# Patient Record
Sex: MALE | Race: OTHER | Employment: UNEMPLOYED | URBAN - NONMETROPOLITAN AREA
[De-identification: names, ages, dates, MRNs, and addresses within clinical notes are randomized per-mention and may not be internally consistent; named-entity substitution may affect disease eponyms.]

---

## 2018-07-22 ENCOUNTER — HOSPITAL ENCOUNTER (EMERGENCY)
Age: 43
Discharge: HOME | End: 2018-07-22
Payer: COMMERCIAL

## 2018-07-22 VITALS
DIASTOLIC BLOOD PRESSURE: 90 MMHG | SYSTOLIC BLOOD PRESSURE: 135 MMHG | HEIGHT: 69 IN | WEIGHT: 200 LBS | RESPIRATION RATE: 16 BRPM | OXYGEN SATURATION: 97 % | TEMPERATURE: 98.7 F | BODY MASS INDEX: 29.62 KG/M2 | HEART RATE: 95 BPM

## 2018-07-22 DIAGNOSIS — S71.151A DOG BITE OF RIGHT THIGH, INITIAL ENCOUNTER: Primary | ICD-10-CM
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
## 2018-07-22 PROCEDURE — 99213 OFFICE O/P EST LOW 20 MIN: CPT | Performed by: NURSE PRACTITIONER

## 2018-07-22 PROCEDURE — 6360000002 HC RX W HCPCS: Performed by: NURSE PRACTITIONER

## 2018-07-22 PROCEDURE — 90471 IMMUNIZATION ADMIN: CPT | Performed by: NURSE PRACTITIONER

## 2018-07-22 PROCEDURE — 99213 OFFICE O/P EST LOW 20 MIN: CPT

## 2018-07-22 PROCEDURE — 90715 TDAP VACCINE 7 YRS/> IM: CPT | Performed by: NURSE PRACTITIONER

## 2018-07-22 RX ORDER — AMOXICILLIN AND CLAVULANATE POTASSIUM 875; 125 MG/1; MG/1
1 TABLET, FILM COATED ORAL 2 TIMES DAILY
Qty: 20 TABLET | Refills: 0 | Status: SHIPPED | OUTPATIENT
Start: 2018-07-22 | End: 2018-08-01

## 2018-07-22 RX ORDER — CLINDAMYCIN HYDROCHLORIDE 300 MG/1
300 CAPSULE ORAL 3 TIMES DAILY
Qty: 30 CAPSULE | Refills: 0 | Status: SHIPPED | OUTPATIENT
Start: 2018-07-22 | End: 2018-08-01

## 2018-07-22 RX ADMIN — TETANUS TOXOID, REDUCED DIPHTHERIA TOXOID AND ACELLULAR PERTUSSIS VACCINE, ADSORBED 0.5 ML: 5; 2.5; 8; 8; 2.5 SUSPENSION INTRAMUSCULAR at 16:33

## 2018-07-22 ASSESSMENT — PAIN SCALES - GENERAL: PAINLEVEL_OUTOF10: 3

## 2018-07-22 ASSESSMENT — ENCOUNTER SYMPTOMS
APNEA: 0
ABDOMINAL DISTENTION: 0
EYE PAIN: 0
SHORTNESS OF BREATH: 0
EYE DISCHARGE: 0
SORE THROAT: 0
BACK PAIN: 0
CHEST TIGHTNESS: 0
DIARRHEA: 0
NAUSEA: 0
CONSTIPATION: 0
PHOTOPHOBIA: 0
FACIAL SWELLING: 0

## 2018-07-22 ASSESSMENT — PAIN DESCRIPTION - ORIENTATION: ORIENTATION: RIGHT;OUTER;UPPER

## 2018-07-22 ASSESSMENT — PAIN DESCRIPTION - LOCATION: LOCATION: LEG

## 2018-07-22 NOTE — ED NOTES
Discharge assessment complete. No changes. All discharge education and information given. Instructed to go to ED for any worsening symptoms. Verbalized Understanding. Left in stable cond.      Shanon Tejeda RN  07/22/18 7861

## 2018-07-22 NOTE — ED NOTES
Presents with c/o dog bite to right upper, outer thigh at approx 1215 today. St bit by stray casper bull dog.       Shanon Tejeda RN  07/22/18 [id number]

## 2018-07-22 NOTE — ED PROVIDER NOTES
MEDICATIONS       Previous Medications    No medications on file       ALLERGIES     Patient is is allergic to dilaudid [hydromorphone]. FAMILY HISTORY     Patient's family history is not on file. SOCIAL HISTORY     Patient  reports that he has never smoked. He has never used smokeless tobacco. He reports that he does not use drugs. PHYSICAL EXAM     ED TRIAGE VITALS  BP: (!) 135/90, Temp: 98.7 °F (37.1 °C), Pulse: 95, Resp: 16, SpO2: 97 %  Physical Exam   Constitutional: He is oriented to person, place, and time. He appears well-developed and well-nourished. No distress. HENT:   Head: Normocephalic. Nose: Nose normal.   Mouth/Throat: Oropharynx is clear and moist.   Eyes: Right eye exhibits no discharge. Left eye exhibits no discharge. No scleral icterus. Neck: Normal range of motion. Cardiovascular: Normal rate, regular rhythm, normal heart sounds and intact distal pulses. Pulmonary/Chest: Effort normal and breath sounds normal. He has no wheezes. He has no rales. Abdominal: Soft. Bowel sounds are normal. He exhibits no distension. There is no tenderness. Musculoskeletal: Normal range of motion. He exhibits tenderness. He exhibits no edema. Right upper leg: He exhibits tenderness and swelling. He exhibits no edema and no deformity. Legs:  2 small bite marks which have not penetrated the skin. 1- 5mm linear cut into the skin, shallow, no bleeding, no surrounding bruising or erythema. Lymphadenopathy:     He has no cervical adenopathy. Neurological: He is alert and oriented to person, place, and time. Skin: Skin is warm and dry. No rash noted. There is erythema. Psychiatric: He has a normal mood and affect. His behavior is normal. Judgment and thought content normal.   Nursing note and vitals reviewed. DIAGNOSTIC RESULTS   Labs:No results found for this visit on 07/22/18.     IMAGING:  No orders to display     URGENT CARE COURSE:     Vitals:    07/22/18 1605   BP:

## 2019-03-10 ENCOUNTER — HOSPITAL ENCOUNTER (EMERGENCY)
Age: 44
Discharge: HOME | End: 2019-03-10
Payer: COMMERCIAL

## 2019-03-10 VITALS
HEIGHT: 67 IN | DIASTOLIC BLOOD PRESSURE: 99 MMHG | HEART RATE: 100 BPM | TEMPERATURE: 99.2 F | SYSTOLIC BLOOD PRESSURE: 146 MMHG | BODY MASS INDEX: 30.61 KG/M2 | RESPIRATION RATE: 16 BRPM | OXYGEN SATURATION: 97 % | WEIGHT: 195 LBS

## 2019-03-10 DIAGNOSIS — R03.0 ELEVATED BLOOD PRESSURE READING: ICD-10-CM

## 2019-03-10 DIAGNOSIS — J02.0 STREPTOCOCCAL SORE THROAT: Primary | ICD-10-CM

## 2019-03-10 LAB
FLU A ANTIGEN: NEGATIVE
FLU B ANTIGEN: NEGATIVE
GROUP A STREP CULTURE, REFLEX: POSITIVE
REFLEX THROAT C + S: NORMAL

## 2019-03-10 PROCEDURE — 87804 INFLUENZA ASSAY W/OPTIC: CPT

## 2019-03-10 PROCEDURE — 2500000003 HC RX 250 WO HCPCS: Performed by: NURSE PRACTITIONER

## 2019-03-10 PROCEDURE — 99214 OFFICE O/P EST MOD 30 MIN: CPT | Performed by: NURSE PRACTITIONER

## 2019-03-10 PROCEDURE — 87880 STREP A ASSAY W/OPTIC: CPT

## 2019-03-10 PROCEDURE — 6360000002 HC RX W HCPCS: Performed by: NURSE PRACTITIONER

## 2019-03-10 PROCEDURE — 99213 OFFICE O/P EST LOW 20 MIN: CPT

## 2019-03-10 RX ORDER — AMOXICILLIN 500 MG/1
500 CAPSULE ORAL 2 TIMES DAILY
Qty: 20 CAPSULE | Refills: 0 | Status: SHIPPED | OUTPATIENT
Start: 2019-03-10 | End: 2019-03-20

## 2019-03-10 RX ADMIN — LIDOCAINE HYDROCHLORIDE 1 G: 10 INJECTION, SOLUTION EPIDURAL; INFILTRATION; INTRACAUDAL; PERINEURAL at 13:09

## 2019-03-10 ASSESSMENT — ENCOUNTER SYMPTOMS
DIARRHEA: 0
SINUS PRESSURE: 0
NAUSEA: 0
VOMITING: 0
SHORTNESS OF BREATH: 0
COUGH: 0
SORE THROAT: 1
CHEST TIGHTNESS: 0
ABDOMINAL PAIN: 0
RHINORRHEA: 0

## 2019-03-10 ASSESSMENT — PAIN SCALES - GENERAL: PAINLEVEL_OUTOF10: 10

## 2019-03-10 ASSESSMENT — PAIN DESCRIPTION - LOCATION: LOCATION: THROAT

## 2019-07-23 ENCOUNTER — HOSPITAL ENCOUNTER (EMERGENCY)
Age: 44
Discharge: HOME | End: 2019-07-24
Payer: COMMERCIAL

## 2019-07-23 VITALS
HEIGHT: 69 IN | RESPIRATION RATE: 16 BRPM | DIASTOLIC BLOOD PRESSURE: 91 MMHG | BODY MASS INDEX: 29.62 KG/M2 | SYSTOLIC BLOOD PRESSURE: 145 MMHG | HEART RATE: 80 BPM | TEMPERATURE: 98 F | WEIGHT: 200 LBS | OXYGEN SATURATION: 98 %

## 2019-07-23 DIAGNOSIS — M54.50 ACUTE EXACERBATION OF CHRONIC LOW BACK PAIN: Primary | ICD-10-CM

## 2019-07-23 DIAGNOSIS — G89.29 ACUTE EXACERBATION OF CHRONIC LOW BACK PAIN: Primary | ICD-10-CM

## 2019-07-23 PROCEDURE — 99283 EMERGENCY DEPT VISIT LOW MDM: CPT

## 2019-07-23 RX ORDER — ORPHENADRINE CITRATE 30 MG/ML
60 INJECTION INTRAMUSCULAR; INTRAVENOUS ONCE
Status: COMPLETED | OUTPATIENT
Start: 2019-07-24 | End: 2019-07-24

## 2019-07-23 RX ORDER — KETOROLAC TROMETHAMINE 30 MG/ML
30 INJECTION, SOLUTION INTRAMUSCULAR; INTRAVENOUS ONCE
Status: COMPLETED | OUTPATIENT
Start: 2019-07-24 | End: 2019-07-24

## 2019-07-23 ASSESSMENT — ENCOUNTER SYMPTOMS
WHEEZING: 0
VOMITING: 0
EYE REDNESS: 0
NAUSEA: 0
SORE THROAT: 0
BACK PAIN: 1
EYE DISCHARGE: 0
RHINORRHEA: 0
ABDOMINAL PAIN: 0
DIARRHEA: 0
COUGH: 0
SHORTNESS OF BREATH: 0

## 2019-07-23 ASSESSMENT — PAIN DESCRIPTION - ORIENTATION: ORIENTATION: RIGHT;MID

## 2019-07-23 ASSESSMENT — PAIN DESCRIPTION - LOCATION: LOCATION: BACK

## 2019-07-23 ASSESSMENT — PAIN SCALES - GENERAL: PAINLEVEL_OUTOF10: 8

## 2019-07-23 ASSESSMENT — PAIN DESCRIPTION - DESCRIPTORS: DESCRIPTORS: SHARP

## 2019-07-23 ASSESSMENT — PAIN DESCRIPTION - PAIN TYPE: TYPE: ACUTE PAIN

## 2019-07-24 ENCOUNTER — APPOINTMENT (OUTPATIENT)
Dept: GENERAL RADIOLOGY | Age: 44
End: 2019-07-24
Payer: COMMERCIAL

## 2019-07-24 PROCEDURE — 72100 X-RAY EXAM L-S SPINE 2/3 VWS: CPT

## 2019-07-24 PROCEDURE — 96372 THER/PROPH/DIAG INJ SC/IM: CPT

## 2019-07-24 PROCEDURE — 6370000000 HC RX 637 (ALT 250 FOR IP): Performed by: NURSE PRACTITIONER

## 2019-07-24 PROCEDURE — 6360000002 HC RX W HCPCS: Performed by: NURSE PRACTITIONER

## 2019-07-24 RX ORDER — METHYLPREDNISOLONE SODIUM SUCCINATE 125 MG/2ML
125 INJECTION, POWDER, LYOPHILIZED, FOR SOLUTION INTRAMUSCULAR; INTRAVENOUS ONCE
Status: COMPLETED | OUTPATIENT
Start: 2019-07-24 | End: 2019-07-24

## 2019-07-24 RX ORDER — LIDOCAINE 4 G/G
1 PATCH TOPICAL ONCE
Status: DISCONTINUED | OUTPATIENT
Start: 2019-07-24 | End: 2019-07-24 | Stop reason: HOSPADM

## 2019-07-24 RX ADMIN — ORPHENADRINE CITRATE 60 MG: 30 INJECTION INTRAMUSCULAR; INTRAVENOUS at 00:10

## 2019-07-24 RX ADMIN — METHYLPREDNISOLONE SODIUM SUCCINATE 125 MG: 125 INJECTION, POWDER, FOR SOLUTION INTRAMUSCULAR; INTRAVENOUS at 01:58

## 2019-07-24 RX ADMIN — KETOROLAC TROMETHAMINE 30 MG: 30 INJECTION, SOLUTION INTRAMUSCULAR at 00:09

## 2019-07-24 ASSESSMENT — PAIN SCALES - GENERAL
PAINLEVEL_OUTOF10: 8
PAINLEVEL_OUTOF10: 2

## 2019-07-24 NOTE — ED PROVIDER NOTES
[facility], [place]  eMERGENCY dEPARTMENT eNCOUnter          CHIEF COMPLAINT       Chief Complaint   Patient presents with    Back Pain       Nurses Notes reviewed and I agree except as noted in the HPI. HISTORY OF PRESENT ILLNESS    Rebecca Garcia is a 38 y.o. male who presents to the EmergencyDepartment for the evaluation of right sided back pain. The patient reports to have been involved in a forklift accident 5 months ago. He states to have been experiencing right sided back pain since his accident but reports that his pain became increasingly more severe today, which has remained constant, causing him to come to the ED for evaluation. He rates his current pain as a 8/10 in severity and aching in character. No radiating pain is reported. Patient denies any new injury or trauma to the right side of his back. He denies to have been taking any OTC medications. The patient reports no additional symptoms or complaints at this time. The HPI was provided by the patient. REVIEW OF SYSTEMS     Review of Systems   Constitutional: Negative for appetite change, chills, fatigue and fever. HENT: Negative for congestion, ear pain, rhinorrhea and sore throat. Eyes: Negative for discharge, redness and visual disturbance. Respiratory: Negative for cough, shortness of breath and wheezing. Cardiovascular: Negative for chest pain, palpitations and leg swelling. Gastrointestinal: Negative for abdominal pain, diarrhea, nausea and vomiting. Genitourinary: Negative for decreased urine volume, difficulty urinating, dysuria and hematuria. Musculoskeletal: Positive for back pain (right). Negative for arthralgias, joint swelling and neck pain. Skin: Negative for pallor and rash. Allergic/Immunologic: Negative for environmental allergies. Neurological: Negative for dizziness, syncope, weakness, light-headedness, numbness and headaches. Hematological: Negative for adenopathy.

## 2021-06-07 ENCOUNTER — HOSPITAL ENCOUNTER (EMERGENCY)
Age: 46
Discharge: HOME | End: 2021-06-07

## 2021-06-07 ENCOUNTER — APPOINTMENT (OUTPATIENT)
Dept: CT IMAGING | Age: 46
End: 2021-06-07

## 2021-06-07 ENCOUNTER — APPOINTMENT (OUTPATIENT)
Dept: GENERAL RADIOLOGY | Age: 46
End: 2021-06-07

## 2021-06-07 VITALS
BODY MASS INDEX: 30.36 KG/M2 | OXYGEN SATURATION: 95 % | DIASTOLIC BLOOD PRESSURE: 95 MMHG | TEMPERATURE: 98.7 F | HEART RATE: 109 BPM | RESPIRATION RATE: 16 BRPM | HEIGHT: 69 IN | SYSTOLIC BLOOD PRESSURE: 151 MMHG | WEIGHT: 205 LBS

## 2021-06-07 DIAGNOSIS — S02.2XXA CLOSED FRACTURE OF NASAL BONE, INITIAL ENCOUNTER: Primary | ICD-10-CM

## 2021-06-07 PROCEDURE — 99283 EMERGENCY DEPT VISIT LOW MDM: CPT

## 2021-06-07 PROCEDURE — 70486 CT MAXILLOFACIAL W/O DYE: CPT

## 2021-06-07 PROCEDURE — 6370000000 HC RX 637 (ALT 250 FOR IP): Performed by: NURSE PRACTITIONER

## 2021-06-07 RX ORDER — AMOXICILLIN AND CLAVULANATE POTASSIUM 875; 125 MG/1; MG/1
1 TABLET, FILM COATED ORAL 2 TIMES DAILY
Qty: 20 TABLET | Refills: 0 | Status: SHIPPED | OUTPATIENT
Start: 2021-06-07 | End: 2021-06-17

## 2021-06-07 RX ORDER — HYDROCODONE BITARTRATE AND ACETAMINOPHEN 5; 325 MG/1; MG/1
1 TABLET ORAL ONCE
Status: COMPLETED | OUTPATIENT
Start: 2021-06-07 | End: 2021-06-07

## 2021-06-07 RX ORDER — HYDROCODONE BITARTRATE AND ACETAMINOPHEN 5; 325 MG/1; MG/1
1 TABLET ORAL EVERY 6 HOURS PRN
Qty: 12 TABLET | Refills: 0 | Status: SHIPPED | OUTPATIENT
Start: 2021-06-07 | End: 2021-06-10

## 2021-06-07 RX ADMIN — HYDROCODONE BITARTRATE AND ACETAMINOPHEN 1 TABLET: 5; 325 TABLET ORAL at 22:31

## 2021-06-07 ASSESSMENT — PAIN SCALES - GENERAL
PAINLEVEL_OUTOF10: 10
PAINLEVEL_OUTOF10: 10

## 2021-06-07 ASSESSMENT — PAIN DESCRIPTION - PAIN TYPE: TYPE: ACUTE PAIN

## 2021-06-07 ASSESSMENT — PAIN DESCRIPTION - LOCATION: LOCATION: NOSE

## 2021-06-08 NOTE — ED PROVIDER NOTES
[facility] Emergency Department    CHIEF COMPLAINT     No chief complaint on file. Nurses Notes reviewed and I agree except as noted in the HPI. HISTORY OF PRESENT ILLNESS    Telma Kamara laura 40 y.o. male who presents to the ED for evaluation of a nasal injury. 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 It bled immediately. Nose is swollen and tender, appears deformed. HPI was provided by the patient    REVIEW OF SYSTEMS     Review of Systems   Constitutional: Negative for chills, fatigue and fever. HENT: Positive for facial swelling and nosebleeds. Negative for congestion, ear discharge, ear pain, postnasal drip and rhinorrhea. Eyes: Negative for redness. Respiratory: Negative for cough, chest tightness and shortness of breath. Cardiovascular: Negative for chest pain and leg swelling. Gastrointestinal: Negative for abdominal pain, nausea and vomiting. Genitourinary: Negative for difficulty urinating, dysuria, enuresis, flank pain and hematuria. Musculoskeletal: Positive for arthralgias. Negative for back pain and joint swelling. Skin: Positive for wound. Negative for rash. Neurological: Negative for dizziness, light-headedness, numbness and headaches. Psychiatric/Behavioral: Negative for agitation, behavioral problems and confusion. All other systems negative except as noted. PAST MEDICAL HISTORY     Past Medical History:   Diagnosis Date    Kidney stones        SURGICALHISTORY      has a past surgical history that includes Lithotripsy. CURRENT MEDICATIONS       Previous Medications    No medications on file       ALLERGIES     is allergic to dilaudid [hydromorphone]. FAMILY HISTORY     has no family status information on file. family history is not on file.     SOCIAL HISTORY       Social History     Socioeconomic History    Marital status:      Spouse name: Not on file    Number of children: Not on file    Years of education: Not on file    Highest education level: Not on file   Occupational History    Not on file   Tobacco Use    Smoking status: Never Smoker    Smokeless tobacco: Never Used   Substance and Sexual Activity    Alcohol use: Not on file     Comment: occassionally    Drug use: No    Sexual activity: Not on file   Other Topics Concern    Not on file   Social History Narrative    Not on file     Social Determinants of Health     Financial Resource Strain:     Difficulty of Paying Living Expenses:    Food Insecurity:     Worried About Running Out of Food in the Last Year:     [address] in the Last Year:    Transportation Needs:     Lack of Transportation (Medical):  Lack of Transportation (Non-Medical):    Physical Activity:     Days of Exercise per Week:     Minutes of Exercise per Session:    Stress:     Feeling of Stress :    Social Connections:     Frequency of Communication with Friends and Family:     Frequency of Social Gatherings with Friends and Family:     Attends Congregational Services:     Active Member of Clubs or Organizations:     Attends Club or Organization Meetings:     Marital Status:    Intimate Partner Violence:     Fear of Current or Ex-Partner:     Emotionally Abused:     Physically Abused:     Sexually Abused:        PHYSICAL EXAM     INITIAL VITALS:  height is 5' 9\" (1.753 m) and weight is 205 lb (93 kg). His oral temperature is 98.7 °F (37.1 °C). His blood pressure is 151/95 (abnormal) and his pulse is 109. His respiration is 16 and oxygen saturation is 95%. Physical Exam  Vitals and nursing note reviewed. Constitutional:       General: He is not in acute distress. Appearance: He is well-developed. He is not diaphoretic. HENT:      Head: Normocephalic. Nose: Nasal deformity, signs of injury, nasal tenderness and mucosal edema present. Right Nostril: Epistaxis present. No septal hematoma or occlusion. Left Nostril: Epistaxis present. No septal hematoma or occlusion. Mouth/Throat:      Mouth: Mucous membranes are moist.      Pharynx: Oropharynx is clear. Eyes:      General:         Right eye: No discharge. Left eye: No discharge. Conjunctiva/sclera: Conjunctivae normal.   Neck:      Trachea: No tracheal deviation. Cardiovascular:      Rate and Rhythm: Normal rate and regular rhythm. Heart sounds: Normal heart sounds. No murmur heard. No gallop. Comments: Normal capillary refill  Pulmonary:      Effort: Pulmonary effort is normal. No respiratory distress. Breath sounds: Normal breath sounds. No stridor. Abdominal:      General: Bowel sounds are normal.      Palpations: Abdomen is soft. Musculoskeletal:         General: No tenderness or deformity. Normal range of motion. Cervical back: Normal range of motion. Skin:     General: Skin is warm and dry. Capillary Refill: Capillary refill takes less than 2 seconds. Coloration: Skin is not pale. Findings: No erythema or rash. Neurological:      General: No focal deficit present. Mental Status: He is alert and oriented to person, place, and time. Cranial Nerves: No cranial nerve deficit. Psychiatric:         Behavior: Behavior normal.         DIFFERENTIAL DIAGNOSIS:   Nose fracture, nasal contusion    DIAGNOSTIC RESULTS     EKG: All EKG's are interpreted by the Emergency Department Physician who eithersigns or Co-signs this chart in the absence of a cardiologist.        RADIOLOGY: non-plainfilm images(s) such as CT, Ultrasound and MRI are read by the radiologist.  Plain radiographic images are visualized and preliminarily interpreted by the emergency physician unless otherwise stated below. CT FACIAL BONES WO CONTRAST   Final Result   Acute nasal bone fracture.       This document has been electronically signed by: David Michele MD on    06/07/2021 09:14 PM      All CTs at this facility use DISPOSITION/PLAN   DISPOSITION Decision To Discharge 06/07/2021 10:19:14 PM      PATIENT REFERREDTO:  Taran Polanco MD  [address] [place] [address]  [phone]    Schedule an appointment as soon as possible for a visit in 2 days  For follow up    Elham Rangel MD  [address] [id number] [facility] [address]    Schedule an appointment as soon as possible for a visit in 2 days  For follow up, For wound re-check      DISCHARGE MEDICATIONS:  New Prescriptions    AMOXICILLIN-CLAVULANATE (AUGMENTIN) 875-125 MG PER TABLET    Take 1 tablet by mouth 2 times daily for 10 days    HYDROCODONE-ACETAMINOPHEN (NORCO) 5-325 MG PER TABLET    Take 1 tablet by mouth every 6 hours as needed for Pain for up to 3 days.        (Please note that portions of this note were completed with a voice recognition program.  Efforts were made to edit the dictations but occasionally words are mis-transcribed.)         SILVANO Moreno CNP, APRN - CNP  06/17/21 9939

## 2021-06-17 ASSESSMENT — ENCOUNTER SYMPTOMS
EYE REDNESS: 0
VOMITING: 0
COUGH: 0
FACIAL SWELLING: 1
BACK PAIN: 0
RHINORRHEA: 0
CHEST TIGHTNESS: 0
SHORTNESS OF BREATH: 0
NAUSEA: 0
ABDOMINAL PAIN: 0

## 2021-07-01 ENCOUNTER — HOSPITAL ENCOUNTER (OUTPATIENT)
Age: 46
Setting detail: SPECIMEN
Discharge: HOME | End: 2021-07-01

## 2021-07-02 LAB
CULTURE: NORMAL
DIRECT EXAM: NORMAL
Lab: NORMAL
SPECIMEN DESCRIPTION: NORMAL

## 2021-07-07 LAB
CULTURE: ABNORMAL
DIRECT EXAM: ABNORMAL
DIRECT EXAM: ABNORMAL
Lab: ABNORMAL
SPECIMEN DESCRIPTION: ABNORMAL

## 2021-09-21 ENCOUNTER — APPOINTMENT (OUTPATIENT)
Dept: CT IMAGING | Age: 46
End: 2021-09-21

## 2021-09-21 ENCOUNTER — HOSPITAL ENCOUNTER (OUTPATIENT)
Age: 46
Setting detail: OBSERVATION
Discharge: HOME | End: 2021-09-23
Attending: EMERGENCY MEDICINE | Admitting: INTERNAL MEDICINE

## 2021-09-21 ENCOUNTER — APPOINTMENT (OUTPATIENT)
Dept: GENERAL RADIOLOGY | Age: 46
End: 2021-09-21

## 2021-09-21 DIAGNOSIS — R55 SYNCOPE AND COLLAPSE: Primary | ICD-10-CM

## 2021-09-21 DIAGNOSIS — R07.9 CHEST PAIN, UNSPECIFIED TYPE: ICD-10-CM

## 2021-09-21 PROBLEM — R07.89 OTHER CHEST PAIN: Status: ACTIVE | Noted: 2021-09-21

## 2021-09-21 PROBLEM — R03.0 ELEVATED BP WITHOUT DIAGNOSIS OF HYPERTENSION: Status: ACTIVE | Noted: 2021-09-21

## 2021-09-21 LAB
ALBUMIN SERPL-MCNC: 4.9 G/DL (ref 3.5–5.1)
ALP BLD-CCNC: 69 U/L (ref 38–126)
ALT SERPL-CCNC: 27 U/L (ref 11–66)
ANION GAP SERPL CALCULATED.3IONS-SCNC: 12 MEQ/L (ref 8–16)
AST SERPL-CCNC: 24 U/L (ref 5–40)
BACTERIA: ABNORMAL
BASOPHILS # BLD: 0.6 %
BASOPHILS ABSOLUTE: 0 THOU/MM3 (ref 0–0.1)
BILIRUB SERPL-MCNC: 0.9 MG/DL (ref 0.3–1.2)
BILIRUBIN DIRECT: < 0.2 MG/DL (ref 0–0.3)
BILIRUBIN URINE: NEGATIVE
BLOOD, URINE: NEGATIVE
BUN BLDV-MCNC: 23 MG/DL (ref 7–22)
C-REACTIVE PROTEIN: < 0.3 MG/DL (ref 0–1)
CALCIUM SERPL-MCNC: 9.7 MG/DL (ref 8.5–10.5)
CASTS: ABNORMAL /LPF
CASTS: ABNORMAL /LPF
CHARACTER, URINE: CLEAR
CHLORIDE BLD-SCNC: 107 MEQ/L (ref 98–111)
CO2: 26 MEQ/L (ref 23–33)
COLOR: YELLOW
CREAT SERPL-MCNC: 1.1 MG/DL (ref 0.4–1.2)
CRYSTALS: ABNORMAL
EKG ATRIAL RATE: 70 BPM
EKG ATRIAL RATE: 72 BPM
EKG ATRIAL RATE: 85 BPM
EKG P AXIS: 25 DEGREES
EKG P AXIS: 28 DEGREES
EKG P AXIS: 42 DEGREES
EKG P-R INTERVAL: 144 MS
EKG P-R INTERVAL: 152 MS
EKG P-R INTERVAL: 154 MS
EKG Q-T INTERVAL: 356 MS
EKG Q-T INTERVAL: 384 MS
EKG Q-T INTERVAL: 394 MS
EKG QRS DURATION: 82 MS
EKG QRS DURATION: 84 MS
EKG QRS DURATION: 88 MS
EKG QTC CALCULATION (BAZETT): 420 MS
EKG QTC CALCULATION (BAZETT): 423 MS
EKG QTC CALCULATION (BAZETT): 425 MS
EKG R AXIS: 59 DEGREES
EKG R AXIS: 62 DEGREES
EKG R AXIS: 72 DEGREES
EKG T AXIS: 1 DEGREES
EKG T AXIS: 11 DEGREES
EKG T AXIS: 15 DEGREES
EKG VENTRICULAR RATE: 70 BPM
EKG VENTRICULAR RATE: 72 BPM
EKG VENTRICULAR RATE: 85 BPM
EOSINOPHIL # BLD: 1.7 %
EOSINOPHILS ABSOLUTE: 0.1 THOU/MM3 (ref 0–0.4)
EPITHELIAL CELLS, UA: ABNORMAL /HPF
ERYTHROCYTE [DISTWIDTH] IN BLOOD BY AUTOMATED COUNT: 13.1 % (ref 11.5–14.5)
ERYTHROCYTE [DISTWIDTH] IN BLOOD BY AUTOMATED COUNT: 42.2 FL (ref 35–45)
GFR SERPL CREATININE-BSD FRML MDRD: 72 ML/MIN/1.73M2
GLUCOSE BLD-MCNC: 102 MG/DL (ref 70–108)
GLUCOSE, URINE: NEGATIVE MG/DL
HCT VFR BLD CALC: 45.3 % (ref 42–52)
HEMOGLOBIN: 15.3 GM/DL (ref 14–18)
IMMATURE GRANS (ABS): 0.02 THOU/MM3 (ref 0–0.07)
IMMATURE GRANULOCYTES: 0.3 %
KETONES, URINE: NEGATIVE
LEUKOCYTE ESTERASE, URINE: ABNORMAL
LYMPHOCYTES # BLD: 35.3 %
LYMPHOCYTES ABSOLUTE: 2.5 THOU/MM3 (ref 1–4.8)
MCH RBC QN AUTO: 29.9 PG (ref 26–33)
MCHC RBC AUTO-ENTMCNC: 33.8 GM/DL (ref 32.2–35.5)
MCV RBC AUTO: 88.6 FL (ref 80–94)
MISCELLANEOUS LAB TEST RESULT: ABNORMAL
MONOCYTES # BLD: 11.3 %
MONOCYTES ABSOLUTE: 0.8 THOU/MM3 (ref 0.4–1.3)
NITRITE, URINE: NEGATIVE
NUCLEATED RED BLOOD CELLS: 0 /100 WBC
OSMOLALITY CALCULATION: 292.6 MOSMOL/KG (ref 275–300)
PH UA: 7 (ref 5–9)
PLATELET # BLD: 307 THOU/MM3 (ref 130–400)
PMV BLD AUTO: 10.8 FL (ref 9.4–12.4)
POTASSIUM REFLEX MAGNESIUM: 4 MEQ/L (ref 3.5–5.2)
PRO-BNP: 13.1 PG/ML (ref 0–450)
PROTEIN UA: NEGATIVE MG/DL
RBC # BLD: 5.11 MILL/MM3 (ref 4.7–6.1)
RBC URINE: ABNORMAL /HPF
RENAL EPITHELIAL, UA: ABNORMAL
SEG NEUTROPHILS: 50.8 %
SEGMENTED NEUTROPHILS ABSOLUTE COUNT: 3.6 THOU/MM3 (ref 1.8–7.7)
SODIUM BLD-SCNC: 145 MEQ/L (ref 135–145)
SPECIFIC GRAVITY UA: > 1.03 (ref 1–1.03)
TOTAL PROTEIN: 7.7 G/DL (ref 6.1–8)
TROPONIN T: < 0.01 NG/ML
UROBILINOGEN, URINE: 1 EU/DL (ref 0–1)
WBC # BLD: 7.1 THOU/MM3 (ref 4.8–10.8)
WBC UA: ABNORMAL /HPF
YEAST: ABNORMAL

## 2021-09-21 PROCEDURE — 86140 C-REACTIVE PROTEIN: CPT

## 2021-09-21 PROCEDURE — G0378 HOSPITAL OBSERVATION PER HR: HCPCS

## 2021-09-21 PROCEDURE — 6370000000 HC RX 637 (ALT 250 FOR IP): Performed by: INTERNAL MEDICINE

## 2021-09-21 PROCEDURE — 96376 TX/PRO/DX INJ SAME DRUG ADON: CPT

## 2021-09-21 PROCEDURE — 36415 COLL VENOUS BLD VENIPUNCTURE: CPT

## 2021-09-21 PROCEDURE — 70498 CT ANGIOGRAPHY NECK: CPT

## 2021-09-21 PROCEDURE — 81001 URINALYSIS AUTO W/SCOPE: CPT

## 2021-09-21 PROCEDURE — 85025 COMPLETE CBC W/AUTO DIFF WBC: CPT

## 2021-09-21 PROCEDURE — 80048 BASIC METABOLIC PNL TOTAL CA: CPT

## 2021-09-21 PROCEDURE — 71275 CT ANGIOGRAPHY CHEST: CPT

## 2021-09-21 PROCEDURE — 2580000003 HC RX 258: Performed by: INTERNAL MEDICINE

## 2021-09-21 PROCEDURE — 6360000002 HC RX W HCPCS: Performed by: INTERNAL MEDICINE

## 2021-09-21 PROCEDURE — 84484 ASSAY OF TROPONIN QUANT: CPT

## 2021-09-21 PROCEDURE — 96374 THER/PROPH/DIAG INJ IV PUSH: CPT

## 2021-09-21 PROCEDURE — 83880 ASSAY OF NATRIURETIC PEPTIDE: CPT

## 2021-09-21 PROCEDURE — 6370000000 HC RX 637 (ALT 250 FOR IP): Performed by: NURSE PRACTITIONER

## 2021-09-21 PROCEDURE — 93005 ELECTROCARDIOGRAM TRACING: CPT | Performed by: EMERGENCY MEDICINE

## 2021-09-21 PROCEDURE — 80076 HEPATIC FUNCTION PANEL: CPT

## 2021-09-21 PROCEDURE — 93010 ELECTROCARDIOGRAM REPORT: CPT | Performed by: NUCLEAR MEDICINE

## 2021-09-21 PROCEDURE — 6360000002 HC RX W HCPCS: Performed by: EMERGENCY MEDICINE

## 2021-09-21 PROCEDURE — 99285 EMERGENCY DEPT VISIT HI MDM: CPT

## 2021-09-21 PROCEDURE — 71046 X-RAY EXAM CHEST 2 VIEWS: CPT

## 2021-09-21 PROCEDURE — 99220 PR INITIAL OBSERVATION CARE/DAY 70 MINUTES: CPT | Performed by: INTERNAL MEDICINE

## 2021-09-21 PROCEDURE — 93005 ELECTROCARDIOGRAM TRACING: CPT | Performed by: NURSE PRACTITIONER

## 2021-09-21 PROCEDURE — 6360000004 HC RX CONTRAST MEDICATION: Performed by: EMERGENCY MEDICINE

## 2021-09-21 PROCEDURE — 93005 ELECTROCARDIOGRAM TRACING: CPT | Performed by: INTERNAL MEDICINE

## 2021-09-21 PROCEDURE — 70496 CT ANGIOGRAPHY HEAD: CPT

## 2021-09-21 PROCEDURE — 70450 CT HEAD/BRAIN W/O DYE: CPT

## 2021-09-21 RX ORDER — SODIUM CHLORIDE 0.9 % (FLUSH) 0.9 %
5-40 SYRINGE (ML) INJECTION PRN
Status: DISCONTINUED | OUTPATIENT
Start: 2021-09-21 | End: 2021-09-23 | Stop reason: HOSPADM

## 2021-09-21 RX ORDER — HYDROCODONE BITARTRATE AND ACETAMINOPHEN 5; 325 MG/1; MG/1
1 TABLET ORAL ONCE
Status: COMPLETED | OUTPATIENT
Start: 2021-09-21 | End: 2021-09-21

## 2021-09-21 RX ORDER — LORAZEPAM 0.5 MG/1
0.5 TABLET ORAL 3 TIMES DAILY
Status: DISCONTINUED | OUTPATIENT
Start: 2021-09-21 | End: 2021-09-21

## 2021-09-21 RX ORDER — POLYETHYLENE GLYCOL 3350 17 G/17G
17 POWDER, FOR SOLUTION ORAL DAILY PRN
Status: DISCONTINUED | OUTPATIENT
Start: 2021-09-21 | End: 2021-09-23 | Stop reason: HOSPADM

## 2021-09-21 RX ORDER — SODIUM CHLORIDE 0.9 % (FLUSH) 0.9 %
5-40 SYRINGE (ML) INJECTION EVERY 12 HOURS SCHEDULED
Status: DISCONTINUED | OUTPATIENT
Start: 2021-09-21 | End: 2021-09-23 | Stop reason: HOSPADM

## 2021-09-21 RX ORDER — METOPROLOL TARTRATE 50 MG/1
50 TABLET, FILM COATED ORAL 2 TIMES DAILY
Status: DISCONTINUED | OUTPATIENT
Start: 2021-09-21 | End: 2021-09-23 | Stop reason: HOSPADM

## 2021-09-21 RX ORDER — ONDANSETRON 4 MG/1
4 TABLET, ORALLY DISINTEGRATING ORAL EVERY 8 HOURS PRN
Status: DISCONTINUED | OUTPATIENT
Start: 2021-09-21 | End: 2021-09-23 | Stop reason: HOSPADM

## 2021-09-21 RX ORDER — SODIUM CHLORIDE 9 MG/ML
INJECTION, SOLUTION INTRAVENOUS CONTINUOUS
Status: CANCELLED | OUTPATIENT
Start: 2021-09-21

## 2021-09-21 RX ORDER — MORPHINE SULFATE 2 MG/ML
2 INJECTION, SOLUTION INTRAMUSCULAR; INTRAVENOUS
Status: DISCONTINUED | OUTPATIENT
Start: 2021-09-21 | End: 2021-09-23 | Stop reason: HOSPADM

## 2021-09-21 RX ORDER — ACETAMINOPHEN 325 MG/1
650 TABLET ORAL EVERY 6 HOURS PRN
Status: DISCONTINUED | OUTPATIENT
Start: 2021-09-21 | End: 2021-09-23 | Stop reason: HOSPADM

## 2021-09-21 RX ORDER — MORPHINE SULFATE 4 MG/ML
4 INJECTION, SOLUTION INTRAMUSCULAR; INTRAVENOUS
Status: DISCONTINUED | OUTPATIENT
Start: 2021-09-21 | End: 2021-09-23 | Stop reason: HOSPADM

## 2021-09-21 RX ORDER — SODIUM CHLORIDE 9 MG/ML
25 INJECTION, SOLUTION INTRAVENOUS PRN
Status: DISCONTINUED | OUTPATIENT
Start: 2021-09-21 | End: 2021-09-23 | Stop reason: HOSPADM

## 2021-09-21 RX ORDER — ACETAMINOPHEN 650 MG/1
650 SUPPOSITORY RECTAL EVERY 6 HOURS PRN
Status: DISCONTINUED | OUTPATIENT
Start: 2021-09-21 | End: 2021-09-23 | Stop reason: HOSPADM

## 2021-09-21 RX ORDER — SODIUM CHLORIDE 9 MG/ML
INJECTION, SOLUTION INTRAVENOUS CONTINUOUS
Status: DISCONTINUED | OUTPATIENT
Start: 2021-09-21 | End: 2021-09-23 | Stop reason: HOSPADM

## 2021-09-21 RX ORDER — LORAZEPAM 0.5 MG/1
0.5 TABLET ORAL 3 TIMES DAILY
Status: DISCONTINUED | OUTPATIENT
Start: 2021-09-21 | End: 2021-09-23

## 2021-09-21 RX ORDER — ASPIRIN 81 MG/1
81 TABLET, CHEWABLE ORAL DAILY
Status: DISCONTINUED | OUTPATIENT
Start: 2021-09-22 | End: 2021-09-23 | Stop reason: HOSPADM

## 2021-09-21 RX ORDER — MORPHINE SULFATE 4 MG/ML
4 INJECTION, SOLUTION INTRAMUSCULAR; INTRAVENOUS ONCE
Status: COMPLETED | OUTPATIENT
Start: 2021-09-21 | End: 2021-09-21

## 2021-09-21 RX ORDER — ONDANSETRON 2 MG/ML
4 INJECTION INTRAMUSCULAR; INTRAVENOUS EVERY 6 HOURS PRN
Status: DISCONTINUED | OUTPATIENT
Start: 2021-09-21 | End: 2021-09-23 | Stop reason: HOSPADM

## 2021-09-21 RX ADMIN — MORPHINE SULFATE 2 MG: 2 INJECTION, SOLUTION INTRAMUSCULAR; INTRAVENOUS at 19:52

## 2021-09-21 RX ADMIN — MORPHINE SULFATE 4 MG: 4 INJECTION, SOLUTION INTRAMUSCULAR; INTRAVENOUS at 15:12

## 2021-09-21 RX ADMIN — IOPAMIDOL 80 ML: 755 INJECTION, SOLUTION INTRAVENOUS at 15:29

## 2021-09-21 RX ADMIN — HYDROCODONE BITARTRATE AND ACETAMINOPHEN 1 TABLET: 5; 325 TABLET ORAL at 13:44

## 2021-09-21 RX ADMIN — SODIUM CHLORIDE: 9 INJECTION, SOLUTION INTRAVENOUS at 16:45

## 2021-09-21 RX ADMIN — SODIUM CHLORIDE, PRESERVATIVE FREE 10 ML: 5 INJECTION INTRAVENOUS at 19:54

## 2021-09-21 RX ADMIN — IOPAMIDOL 80 ML: 755 INJECTION, SOLUTION INTRAVENOUS at 12:02

## 2021-09-21 RX ADMIN — LORAZEPAM 0.5 MG: 0.5 TABLET ORAL at 18:12

## 2021-09-21 RX ADMIN — METOPROLOL TARTRATE 50 MG: 50 TABLET, FILM COATED ORAL at 18:09

## 2021-09-21 RX ADMIN — MORPHINE SULFATE 2 MG: 2 INJECTION, SOLUTION INTRAMUSCULAR; INTRAVENOUS at 23:11

## 2021-09-21 ASSESSMENT — PAIN DESCRIPTION - ONSET: ONSET: ON-GOING

## 2021-09-21 ASSESSMENT — PAIN SCALES - GENERAL
PAINLEVEL_OUTOF10: 7
PAINLEVEL_OUTOF10: 7
PAINLEVEL_OUTOF10: 10
PAINLEVEL_OUTOF10: 6
PAINLEVEL_OUTOF10: 10
PAINLEVEL_OUTOF10: 6
PAINLEVEL_OUTOF10: 10
PAINLEVEL_OUTOF10: 6
PAINLEVEL_OUTOF10: 7
PAINLEVEL_OUTOF10: 6

## 2021-09-21 ASSESSMENT — PAIN DESCRIPTION - PROGRESSION: CLINICAL_PROGRESSION: NOT CHANGED

## 2021-09-21 ASSESSMENT — ENCOUNTER SYMPTOMS
SHORTNESS OF BREATH: 0
CHOKING: 0
DIARRHEA: 0
CONSTIPATION: 0
ABDOMINAL DISTENTION: 0
BACK PAIN: 0
EYE PAIN: 0
VOMITING: 0
RHINORRHEA: 0
COUGH: 0
NAUSEA: 0

## 2021-09-21 ASSESSMENT — PAIN DESCRIPTION - LOCATION
LOCATION: CHEST
LOCATION: HEAD;CHEST

## 2021-09-21 ASSESSMENT — PAIN DESCRIPTION - PAIN TYPE
TYPE: ACUTE PAIN
TYPE: ACUTE PAIN

## 2021-09-21 ASSESSMENT — PAIN DESCRIPTION - FREQUENCY: FREQUENCY: CONTINUOUS

## 2021-09-21 ASSESSMENT — PAIN DESCRIPTION - DESCRIPTORS: DESCRIPTORS: SHARP

## 2021-09-21 NOTE — ED NOTES
Pt transported to 6K19 by cart in stable condition. Called 6K and informed the nurse that the patient was on their way to the unit.       Milly Figueroa, LPN  38/53/94 8058

## 2021-09-21 NOTE — ED NOTES
Dr. Angelica Ryan at [facility] to assess patient due to chest pain. Dr. Jaciel You at bedside. Repeat EKG.      Roberto Carlos Rust RN  09/21/21 7873

## 2021-09-21 NOTE — ED NOTES
RN medicated pt per [facility]. Pt resting in cot with respirations even and unlabored. Pt's family is at bedside. Pt voices no concern or need at this time. Call light is within reach. Will continue to monitor.      Ross Ramos RN  09/21/21 2460

## 2021-09-21 NOTE — H&P
HISTORY AND PHYSICAL             Date: 9/21/2021        Patient Name: Ayana Calix     YOB: 1975      Age:  40 y.o. Chief Complaint     Chief Complaint   Patient presents with    Loss of Consciousness          History Obtained From   patient, spouse    History of Present Illness   This is a pt generally healthy and according to his wife a hx of untreated hbp; he has a hx of kidney stones. Today he was sitting at the dinner table when he grabbed his chest and passed out. He apparently had no warning. The EMS was called and they apparently had difficulty getting a pulse. cpr was performed. Their documentation was sparse but a bp of 60/30 was recorded. He was awake in the squad. There was no notation of color change, nausea or sweating. He was brought to ER and ekg, trop, cta of the chest were unremarkable. Just before I saw him he complained of sudden chest pain which subsided in 1-2 minutes; ekg at that time was unchanged. While I was examining the pt a family member commented on numbness in his left hand and left facial assymetry. Thus brain imaging will be performed. Of note, the pt has had intermittent vaguely described chest pains over the past few months, often at the dinner table lasting a few minutes. His wife notes him rubbing his chest.  He has had considerable stressors in the last few months, including the deaths of family members; he learned of his sister's demise just before this event today.     Past Medical History     Past Medical History:   Diagnosis Date    Kidney stones         Past Surgical History     Past Surgical History:   Procedure Laterality Date    LITHOTRIPSY          Medications Prior to Admission     Prior to Admission medications    Not on File        Allergies   Dilaudid [hydromorphone]    Social History     Social History     Tobacco History     Smoking Status  Never Smoker    Smokeless Tobacco Use  Never Used          Alcohol History Alcohol Use Status  Not Asked Comment  occassionally          Drug Use     Drug Use Status  No          Sexual Activity     Sexually Active  Not Asked                Family History   History reviewed. Family hx prostate ca, and heart disease    Review of Systems   Review of Systems  Twelve point ROS completed and found to be negative except as noted above.       Physical Exam   BP (!) 149/104   Pulse 79   Temp 98.6 °F (37 °C) (Oral)   Resp 18   Ht 5' 9\" (1.753 m)   Wt 205 lb (93 kg)   SpO2 98%   BMI 30.27 kg/m²     Physical Exam  General appearance - ill-appearing    Mental Status - alert, oriented to person, place, and time          Neck - supple, no significant adenopathy        Chest - clear to auscultation, no wheezes, rales or rhonchi, symmetric air entry     Heart - normal rate, regular rhythm, normal S1, S2, no murmurs, rubs, clicks or gallops     Abdomen - tenderness noted RUQ         Neurological - No facial assymetry; left  seems weak but there does not seem to be maximum effort; strength in legs equal.  No drift     Musculoskeletal -   msk [id number]}     Extremities - peripheral pulses normal, no pedal edema, no clubbing or cyanosis     Skin - normal coloration and turgor, no rashes, no suspicious skin lesions noted    Labs      Recent Results (from the past 24 hour(s))   EKG 12 Lead    Collection Time: 09/21/21 10:29 AM   Result Value Ref Range    Ventricular Rate 72 BPM    Atrial Rate 72 BPM    P-R Interval 152 ms    QRS Duration 84 ms    Q-T Interval 384 ms    QTc Calculation (Bazett) 420 ms    P Axis 28 degrees    R Axis 72 degrees    T Axis 11 degrees   CBC Auto Differential    Collection Time: 09/21/21 11:00 AM   Result Value Ref Range    WBC 7.1 4.8 - 10.8 thou/mm3    RBC 5.11 4.70 - 6.10 mill/mm3    Hemoglobin 15.3 14.0 - 18.0 gm/dl    Hematocrit 45.3 42.0 - 52.0 %    MCV 88.6 80.0 - 94.0 fL    MCH 29.9 26.0 - 33.0 pg    MCHC 33.8 32.2 - 35.5 gm/dl    RDW-CV 13.1 11.5 - 14.5 % RDW-SD 42.2 35.0 - 45.0 fL    Platelets 807 130 - 510 thou/mm3    MPV 10.8 9.4 - 12.4 fL    Seg Neutrophils 50.8 %    Lymphocytes 35.3 %    Monocytes 11.3 %    Eosinophils 1.7 %    Basophils 0.6 %    Immature Granulocytes 0.3 %    Segs Absolute 3.6 1 - 7 thou/mm3    Lymphocytes Absolute 2.5 1.0 - 4.8 thou/mm3    Monocytes Absolute 0.8 0.4 - 1.3 thou/mm3    Eosinophils Absolute 0.1 0.0 - 0.4 thou/mm3    Basophils Absolute 0.0 0.0 - 0.1 thou/mm3    Immature Grans (Abs) 0.02 0.00 - 0.07 thou/mm3    nRBC 0 /100 wbc   Basic Metabolic Panel w/ Reflex to MG    Collection Time: 09/21/21 11:00 AM   Result Value Ref Range    Sodium 145 135 - 145 meq/L    Potassium reflex Magnesium 4.0 3.5 - 5.2 meq/L    Chloride 107 98 - 111 meq/L    CO2 26 23 - 33 meq/L    Glucose 102 70 - 108 mg/dL    BUN 23 (H) 7 - 22 mg/dL    CREATININE 1.1 0.4 - 1.2 mg/dL    Calcium 9.7 8.5 - 10.5 mg/dL   Hepatic Function Panel    Collection Time: 09/21/21 11:00 AM   Result Value Ref Range    Albumin 4.9 3.5 - 5.1 g/dL    Total Bilirubin 0.9 0.3 - 1.2 mg/dL    Bilirubin, Direct <0.2 0.0 - 0.3 mg/dL    Alkaline Phosphatase 69 38 - 126 U/L    AST 24 5 - 40 U/L    ALT 27 11 - 66 U/L    Total Protein 7.7 6.1 - 8.0 g/dL   Troponin    Collection Time: 09/21/21 11:00 AM   Result Value Ref Range    Troponin T < 0.010 ng/ml   Brain Natriuretic Peptide    Collection Time: 09/21/21 11:00 AM   Result Value Ref Range    Pro-BNP 13.1 0.0 - 450.0 pg/mL   Anion Gap    Collection Time: 09/21/21 11:00 AM   Result Value Ref Range    Anion Gap 12.0 8.0 - 16.0 meq/L   Osmolality    Collection Time: 09/21/21 11:00 AM   Result Value Ref Range    Osmolality Calc 292.6 275.0 - 300.0 mOsmol/kg   Glomerular Filtration Rate, Estimated    Collection Time: 09/21/21 11:00 AM   Result Value Ref Range    Est, Glom Filt Rate 72 (A) ml/min/1.73m2   Urinalysis with microscopic    Collection Time: 09/21/21 12:45 PM   Result Value Ref Range    Glucose, Urine NEGATIVE NEGATIVE mg/dl dose modulation, iterative reconstruction, and/or weight based dosing when appropriate to reduce the radiation dose to as low as reasonably achievable. CONTRAST: type and amount FINDINGS: POSTOPERATIVE CHANGES: None. LINES/TUBES/MECHANICAL DEVICES: None. HEART/MEDIASTINUM: 1. The heart size is normal. 2. There is no pericardial fluid or thickening. PULMONARY ARTERIES: 1. There is no pulmonary embolus. THORACIC AORTA: 1. There is no aneurysm or dissection. LUNG BRIZUELA - INFILTRATE/PLEURAL EFFUSION: 1. Normal. LUNG FIELDS - MASS/NODULE: 1. None. LYMPHADENOPATHY: 1. No pathologically enlarged lymphadenopathy. PNEUMOTHORAX: None. THYROID GLAND: Unremarkable. TRACHEA/ESOPHAGUS: Normal. MUSCULOSKELETAL: 1. There is no acute musculoskeletal abnormality. UPPER ABDOMEN: Unremarkable. OTHER: None. 1. There is no pulmonary embolus. 2. No infiltrate, effusion or suspicious pulmonary nodules. **This report has been created using voice recognition software. It may contain minor errors which are inherent in voice recognition technology. ** Final report electronically signed by Dr. Cindy Ramirez on 9/21/2021 12:22 PM      Assessment      Hospital Problems         Last Modified POA    * (Principal) Syncope and collapse 9/21/2021 Yes    Other chest pain 9/21/2021 Yes    Elevated BP without diagnosis of hypertension 9/21/2021 Yes          Plan   1. Syncope- suspect vasovagal based on bp reading; orthostatics. Lot of stressors  2. Elevated bp- needs long range observation and rx if remains elevated- will arrange outpt followup  3. Chest pain, nonspecific; consult Cardiology; will likely need stress testing  4.  Perception of left facial assymetry ( I do not appreciate); brain imaging    Consultations Ordered:  IP CONSULT TO CARDIOLOGY  IP CONSULT TO SOCIAL WORK    Electronically signed by Sunday Watkins MD on 9/21/21 at 3:18 PM EDT

## 2021-09-21 NOTE — ED TRIAGE NOTES
Patient arrived to room 1 via [facility] with c/o unresponsive episode. Per EMS stated they were called for unresponsive. Per EMS stated police arrived and did a pulse check and found no pulse and CPR was started. Per EMS stated when they arrived and placed patient on monitor patient went into sinus rhythm after getting a pulse. Per EMS report stated patient was bagged. Per EMS stated per PD that patient's lips were blue, EMS stated patient's lips were mohsen in color. Patient is now alert and oriented. Patient doesn't remember what happened.

## 2021-09-21 NOTE — ED NOTES
ED to inpatient nurses report    Chief Complaint   Patient presents with    Loss of Consciousness      Present to ED from home  LOC: alert and orientated to name, place, date  Vital signs   Vitals:    09/21/21 1344 09/21/21 1439 09/21/21 1445 09/21/21 1512   BP: (!) 143/119 (!) 155/121 (!) 156/116 (!) 149/104   Pulse: 72 70 90 79   Resp: 18 17 20 18   Temp:       TempSrc:       SpO2: 97% 97% 97% 98%   Weight:       Height:          Oxygen Baseline room air     Current needs required room air    LDAs:   Peripheral IV 09/21/21 Left Antecubital (Active)   Site Assessment Clean;Dry; Intact 09/21/21 1324   Line Status Flushed;Normal saline locked 09/21/21 1041   Dressing Status Clean;Dry; Intact 09/21/21 1324   Dressing Intervention New 09/21/21 1041     Mobility: Independent  Pending ED orders: complete  Present condition: stable    Electronically signed by Katheryn Hudson RN on 9/21/2021 at 3:36 PM       Katheryn Hudson RN  09/21/21 7845

## 2021-09-21 NOTE — ED NOTES
Pt stets that he is having stabbing chest pain in the middle of his chest near tattoo.      Terrence Lai RN  09/21/21 0962

## 2021-09-21 NOTE — ED NOTES
Bed: 001A  Expected date: 9/21/21  Expected time:   Means of arrival:   Comments:      Nusrat Obrien RN  09/21/21 1029

## 2021-09-21 NOTE — PLAN OF CARE
Problem: Pain:  Goal: Pain level will decrease  Description: Pain level will decrease  Outcome: Ongoing  Note: Scheduled ativan given for pain per MD; PRN pain meds; patient states pain tolerable at 6/10 currently; MD aware of pain; continuing to monitor  Goal: Control of acute pain  Description: Control of acute pain  Outcome: Ongoing  Note: See above comment  Goal: Control of chronic pain  Description: Control of chronic pain  Outcome: Ongoing  Note: See above comment     Problem: Falls - Risk of:  Goal: Will remain free from falls  Description: Will remain free from falls  Outcome: Ongoing  Note: Falls prevention precautions in place including non-skid socks/bed & chair alarm/low bed/wheels locked/safe patient handling/falls sign posted; patient up w/ standby assist; falls education completed; continuing to monitor  Goal: Absence of physical injury  Description: Absence of physical injury  Outcome: Ongoing  Note: No evidence of physical injury upon admission or at this time

## 2021-09-22 ENCOUNTER — APPOINTMENT (OUTPATIENT)
Dept: CARDIAC CATH/INVASIVE PROCEDURES | Age: 46
End: 2021-09-22

## 2021-09-22 LAB
ANION GAP SERPL CALCULATED.3IONS-SCNC: 8 MEQ/L (ref 8–16)
BUN BLDV-MCNC: 21 MG/DL (ref 7–22)
CALCIUM SERPL-MCNC: 8.8 MG/DL (ref 8.5–10.5)
CHLORIDE BLD-SCNC: 108 MEQ/L (ref 98–111)
CHOLESTEROL, TOTAL: 205 MG/DL (ref 100–199)
CO2: 26 MEQ/L (ref 23–33)
CREAT SERPL-MCNC: 1.2 MG/DL (ref 0.4–1.2)
EKG ATRIAL RATE: 59 BPM
EKG P AXIS: 22 DEGREES
EKG P-R INTERVAL: 162 MS
EKG Q-T INTERVAL: 418 MS
EKG QRS DURATION: 86 MS
EKG QTC CALCULATION (BAZETT): 413 MS
EKG R AXIS: 50 DEGREES
EKG T AXIS: 14 DEGREES
EKG VENTRICULAR RATE: 59 BPM
ERYTHROCYTE [DISTWIDTH] IN BLOOD BY AUTOMATED COUNT: 12.9 % (ref 11.5–14.5)
ERYTHROCYTE [DISTWIDTH] IN BLOOD BY AUTOMATED COUNT: 42.6 FL (ref 35–45)
GFR SERPL CREATININE-BSD FRML MDRD: 65 ML/MIN/1.73M2
GLUCOSE BLD-MCNC: 97 MG/DL (ref 70–108)
HCT VFR BLD CALC: 42.1 % (ref 42–52)
HDLC SERPL-MCNC: 30 MG/DL
HEMOGLOBIN: 13.4 GM/DL (ref 14–18)
LDL CHOLESTEROL CALCULATED: 142 MG/DL
LV EF: 60 %
LVEF MODALITY: NORMAL
MCH RBC QN AUTO: 29 PG (ref 26–33)
MCHC RBC AUTO-ENTMCNC: 31.8 GM/DL (ref 32.2–35.5)
MCV RBC AUTO: 91.1 FL (ref 80–94)
PLATELET # BLD: 275 THOU/MM3 (ref 130–400)
PMV BLD AUTO: 10.6 FL (ref 9.4–12.4)
POTASSIUM SERPL-SCNC: 3.7 MEQ/L (ref 3.5–5.2)
RBC # BLD: 4.62 MILL/MM3 (ref 4.7–6.1)
SODIUM BLD-SCNC: 142 MEQ/L (ref 135–145)
TRIGL SERPL-MCNC: 163 MG/DL (ref 0–199)
WBC # BLD: 7.1 THOU/MM3 (ref 4.8–10.8)

## 2021-09-22 PROCEDURE — 93458 L HRT ARTERY/VENTRICLE ANGIO: CPT | Performed by: INTERNAL MEDICINE

## 2021-09-22 PROCEDURE — 80061 LIPID PANEL: CPT

## 2021-09-22 PROCEDURE — 6360000002 HC RX W HCPCS: Performed by: INTERNAL MEDICINE

## 2021-09-22 PROCEDURE — 99205 OFFICE O/P NEW HI 60 MIN: CPT | Performed by: INTERNAL MEDICINE

## 2021-09-22 PROCEDURE — G0378 HOSPITAL OBSERVATION PER HR: HCPCS

## 2021-09-22 PROCEDURE — 96376 TX/PRO/DX INJ SAME DRUG ADON: CPT

## 2021-09-22 PROCEDURE — 93010 ELECTROCARDIOGRAM REPORT: CPT | Performed by: NUCLEAR MEDICINE

## 2021-09-22 PROCEDURE — 80048 BASIC METABOLIC PNL TOTAL CA: CPT

## 2021-09-22 PROCEDURE — 6370000000 HC RX 637 (ALT 250 FOR IP): Performed by: INTERNAL MEDICINE

## 2021-09-22 PROCEDURE — 6360000002 HC RX W HCPCS

## 2021-09-22 PROCEDURE — 93005 ELECTROCARDIOGRAM TRACING: CPT | Performed by: INTERNAL MEDICINE

## 2021-09-22 PROCEDURE — 2580000003 HC RX 258: Performed by: INTERNAL MEDICINE

## 2021-09-22 PROCEDURE — 6360000004 HC RX CONTRAST MEDICATION: Performed by: INTERNAL MEDICINE

## 2021-09-22 PROCEDURE — 2500000003 HC RX 250 WO HCPCS

## 2021-09-22 PROCEDURE — 85027 COMPLETE CBC AUTOMATED: CPT

## 2021-09-22 PROCEDURE — 36415 COLL VENOUS BLD VENIPUNCTURE: CPT

## 2021-09-22 PROCEDURE — C1769 GUIDE WIRE: HCPCS

## 2021-09-22 PROCEDURE — C1894 INTRO/SHEATH, NON-LASER: HCPCS

## 2021-09-22 RX ORDER — SODIUM CHLORIDE 0.9 % (FLUSH) 0.9 %
5-40 SYRINGE (ML) INJECTION PRN
Status: DISCONTINUED | OUTPATIENT
Start: 2021-09-22 | End: 2021-09-22 | Stop reason: SDUPTHER

## 2021-09-22 RX ORDER — SODIUM CHLORIDE 9 MG/ML
INJECTION, SOLUTION INTRAVENOUS CONTINUOUS
Status: DISCONTINUED | OUTPATIENT
Start: 2021-09-22 | End: 2021-09-23 | Stop reason: HOSPADM

## 2021-09-22 RX ADMIN — LORAZEPAM 0.5 MG: 0.5 TABLET ORAL at 17:02

## 2021-09-22 RX ADMIN — MORPHINE SULFATE 2 MG: 2 INJECTION, SOLUTION INTRAMUSCULAR; INTRAVENOUS at 04:21

## 2021-09-22 RX ADMIN — IOPAMIDOL 50 ML: 755 INJECTION, SOLUTION INTRAVENOUS at 14:59

## 2021-09-22 RX ADMIN — LORAZEPAM 0.5 MG: 0.5 TABLET ORAL at 08:35

## 2021-09-22 RX ADMIN — METOPROLOL TARTRATE 50 MG: 50 TABLET, FILM COATED ORAL at 08:35

## 2021-09-22 RX ADMIN — LORAZEPAM 0.5 MG: 0.5 TABLET ORAL at 22:06

## 2021-09-22 RX ADMIN — SODIUM CHLORIDE: 9 INJECTION, SOLUTION INTRAVENOUS at 02:40

## 2021-09-22 RX ADMIN — ASPIRIN 81 MG: 81 TABLET, CHEWABLE ORAL at 08:35

## 2021-09-22 RX ADMIN — METOPROLOL TARTRATE 50 MG: 50 TABLET, FILM COATED ORAL at 22:06

## 2021-09-22 RX ADMIN — SODIUM CHLORIDE, PRESERVATIVE FREE 10 ML: 5 INJECTION INTRAVENOUS at 22:08

## 2021-09-22 RX ADMIN — MORPHINE SULFATE 2 MG: 2 INJECTION, SOLUTION INTRAMUSCULAR; INTRAVENOUS at 08:35

## 2021-09-22 ASSESSMENT — PAIN DESCRIPTION - FREQUENCY: FREQUENCY: CONTINUOUS

## 2021-09-22 ASSESSMENT — PAIN DESCRIPTION - LOCATION: LOCATION: CHEST

## 2021-09-22 ASSESSMENT — PAIN SCALES - GENERAL
PAINLEVEL_OUTOF10: 6
PAINLEVEL_OUTOF10: 0

## 2021-09-22 ASSESSMENT — PAIN DESCRIPTION - PAIN TYPE: TYPE: ACUTE PAIN

## 2021-09-22 ASSESSMENT — PAIN DESCRIPTION - DESCRIPTORS: DESCRIPTORS: CONSTANT

## 2021-09-22 NOTE — ED PROVIDER NOTES
[facility] ENCOUNTER          Pt Name: Ariana Winters  MRN: [id number]  [place] 1975  Date of evaluation: 2021  Treating Resident Physician: Jian Kennedy MD  Supervising Physician: Brian Alvarado, [address] [place] [address]       Chief Complaint   Patient presents with    Loss of Consciousness     History obtained from the patient. HISTORY OF PRESENT ILLNESS    HPI  Ariana Winters is a 40 y.o. male who presents to the emergency department for evaluation of loss of consciousness. Per report patient and his family patient was very upset because he found out his sister had  this morning. Patient's family stated that he was sitting at the table eating and then suddenly grabbed his chest and passed out. Family states at that time he fell but could not find a pulse and started CPR. They called 911 and CPR continued for approximately 4 minutes. Patient is now complaining of midsternal chest pain that is a 7 out of 10 he to mid sternum on pain scale. The patient has no other acute complaints at this time. REVIEW OF SYSTEMS   Review of Systems   Constitutional: Positive for fever. Negative for fatigue and unexpected weight change. HENT: Negative for congestion, ear pain and rhinorrhea. Eyes: Negative for pain. Respiratory: Negative for cough, choking and shortness of breath. Cardiovascular: Positive for chest pain. Gastrointestinal: Negative for abdominal distention, constipation, diarrhea, nausea and vomiting. Genitourinary: Negative for difficulty urinating. Musculoskeletal: Negative for arthralgias and back pain. Neurological: Negative for dizziness, tremors, seizures, syncope, facial asymmetry, speech difficulty, weakness, light-headedness, numbness and headaches.    Psychiatric/Behavioral:        Anxiety and sad regarding sister dying          PAST MEDICAL AND SURGICAL HISTORY     Past Medical History: history. PREVIOUS RECORDS   Previous records reviewed: today        PHYSICAL EXAM     ED Triage Vitals [21 1030]   BP Temp Temp Source Pulse Resp SpO2 Height Weight   (!) 137/106 98.6 °F (37 °C) Oral 72 18 96 % 5' 9\" (1.753 m) 205 lb (93 kg)     Initial vital signs and nursing assessment reviewed and normal. Body mass index is 30.27 kg/m². Pulsoximetry is normal per my interpretation. Additional Vital Signs:  Vitals:    21 1945   BP: 133/88   Pulse: 65   Resp: 16   Temp: 98.2 °F (36.8 °C)   SpO2: 96%       Physical Exam  Constitutional:       Appearance: Normal appearance. HENT:      Head: Normocephalic. Right Ear: External ear normal.      Left Ear: External ear normal.      Nose: Nose normal.      Mouth/Throat:      Mouth: Mucous membranes are moist.      Pharynx: Oropharynx is clear. Eyes:      Conjunctiva/sclera: Conjunctivae normal.      Pupils: Pupils are equal, round, and reactive to light. Cardiovascular:      Rate and Rhythm: Normal rate and regular rhythm. Pulses: Normal pulses. Heart sounds: Normal heart sounds. Comments: Sternal chest pain noted with palpation. Pulmonary:      Effort: Pulmonary effort is normal.      Breath sounds: Normal breath sounds. Abdominal:      General: Bowel sounds are normal.      Palpations: Abdomen is soft. Musculoskeletal:         General: Normal range of motion. Cervical back: Normal range of motion and neck supple. Skin:     General: Skin is warm and dry. Capillary Refill: Capillary refill takes less than 2 seconds. Neurological:      General: No focal deficit present. Mental Status: He is alert. Psychiatric:      Comments: Patient was tearful when discussing how his sister recently . MEDICAL DECISION MAKING   Initial Assessment:   Patient is a 21-year-old male with past medical history of hypertension who presents today after losing consciousness.   Per report of patient family patient had found out that his sister had  this morning while eating breakfast.  He grabbed his chest and then passed out. Per report of family they did not feel pulse and they started CPR which lasted for approximately 4 minutes. Patient on exam in the ED noted in no acute distress and awake alert answering appropriately with stabilized vital signs. Patient had diagnostic labs including troponin and EKG this showed no acute abnormality. Patient will be admitted to the hospitalist at this time for further evaluation and treatment. Plan:   Patient will be admitted to the hospital for further evaluation and treatment. ED RESULTS   Laboratory results:  Labs Reviewed   BASIC METABOLIC PANEL W/ REFLEX TO MG FOR LOW K - Abnormal; Notable for the following components:       Result Value    BUN 23 (*)     All other components within normal limits   URINALYSIS WITH MICROSCOPIC - Abnormal; Notable for the following components:    Specific Gravity, UA >1.030 (*)     Leukocyte Esterase, Urine TRACE (*)     All other components within normal limits   GLOMERULAR FILTRATION RATE, ESTIMATED - Abnormal; Notable for the following components:    Est, Glom Filt Rate 72 (*)     All other components within normal limits   CBC WITH AUTO DIFFERENTIAL   HEPATIC FUNCTION PANEL   TROPONIN   BRAIN NATRIURETIC PEPTIDE   ANION GAP   OSMOLALITY   C-REACTIVE PROTEIN   TROPONIN   TROPONIN   CBC   BASIC METABOLIC PANEL   LIPID PANEL       Radiologic studies results:  CT HEAD WO CONTRAST   Final Result      No mass effect or acute hemorrhage. **This report has been created using voice recognition software. It may contain minor errors which are inherent in voice recognition technology. **      Final report electronically signed by Dr. Savannah Cormier on 2021 3:52 PM      CTA HEAD W WO CONTRAST   Final Result       1. No flow-limiting stenosis or aneurysm in the intracranial circulation.    2. Mild stenosis at the left carotid bulb which is not hemodynamically significant by NASCET criteria. **This report has been created using voice recognition software. It may contain minor errors which are inherent in voice recognition technology. **      Final report electronically signed by Dr. Olya Gongora MD on 9/21/2021 4:31 PM      CTA NECK W WO CONTRAST   Final Result       1. No flow-limiting stenosis or aneurysm in the intracranial circulation. 2. Mild stenosis at the left carotid bulb which is not hemodynamically significant by NASCET criteria. **This report has been created using voice recognition software. It may contain minor errors which are inherent in voice recognition technology. **      Final report electronically signed by Dr. Olya Gongora MD on 9/21/2021 4:31 PM      CTA CHEST W WO CONTRAST   Final Result   1. There is no pulmonary embolus. 2. No infiltrate, effusion or suspicious pulmonary nodules. **This report has been created using voice recognition software. It may contain minor errors which are inherent in voice recognition technology. **      Final report electronically signed by Dr. Ganesh Ham on 9/21/2021 12:22 PM      XR CHEST (2 VW)   Final Result   1. Unremarkable PA and lateral views of the chest.            **This report has been created using voice recognition software. It may contain minor errors which are inherent in voice recognition technology. **      Final report electronically signed by Dr. Ganesh Ham on 9/21/2021 11:51 AM          ED Medications administered this visit:   Medications   0.9 % sodium chloride infusion ( IntraVENous New Bag 9/21/21 1645)   sodium chloride flush 0.9 % injection 5-40 mL (10 mLs IntraVENous Given 9/21/21 1954)   sodium chloride flush 0.9 % injection 5-40 mL (has no administration in time range)   0.9 % sodium chloride infusion (has no administration in time range)   ondansetron (ZOFRAN-ODT) disintegrating tablet 4 mg (has no administration in time range)     Or   ondansetron (ZOFRAN) injection 4 mg (has no administration in time range)   acetaminophen (TYLENOL) tablet 650 mg (has no administration in time range)     Or   acetaminophen (TYLENOL) suppository 650 mg (has no administration in time range)   polyethylene glycol (GLYCOLAX) packet 17 g (has no administration in time range)   aspirin chewable tablet 81 mg (has no administration in time range)   metoprolol tartrate (LOPRESSOR) tablet 50 mg (50 mg Oral Given 9/21/21 1809)   morphine (PF) injection 2 mg (2 mg IntraVENous Given 9/21/21 1952)     Or   morphine injection 4 mg ( IntraVENous See Alternative 9/21/21 1952)   LORazepam (ATIVAN) tablet 0.5 mg (0.5 mg Oral Given 9/21/21 1812)   iopamidol (ISOVUE-370) 76 % injection 80 mL (80 mLs IntraVENous Given 9/21/21 1202)   HYDROcodone-acetaminophen (NORCO) 5-325 MG per tablet 1 tablet (1 tablet Oral Given 9/21/21 1344)   morphine injection 4 mg (4 mg IntraVENous Given 9/21/21 1512)   iopamidol (ISOVUE-370) 76 % injection 80 mL (80 mLs IntraVENous Given 9/21/21 1529)         ED COURSE     ED Course as of Sep 21 2133   Tue Sep 21, 2021   1456 Patient called out with increased chest pain. Left-sided sharp stabbing going to her left arm. States it felt like a muscle had pulled and I was called to bedside. Repeat ECG obtained showed to be sinus rhythm no acute ST-T wave changes. Morphine added. Notify Dr. Bhupinder Cummins who came to bedside to evaluate patient. [DD]      ED Course User Index  [DD] Mery , DO       Strict return precautions and follow up instructions were discussed with the patient prior to discharge, with which the patient agrees. MEDICATION CHANGES   There are no discharge medications for this patient. FINAL DISPOSITION     Final diagnoses:   Syncope and collapse   Chest pain, unspecified type     Condition: condition: good  Dispo: admit to telemetry      This transcription was electronically signed.  Parts of this transcriptions may have been dictated by use of voice recognition software and electronically transcribed, and parts may have been transcribed with the assistance of an ED scribe. The transcription may contain errors not detected in proofreading. Please refer to my supervising physician's documentation if my documentation differs. Electronically Signed: Lalo Rosenthal MD, 09/21/21, 9:33 PM       Lalo Rosenthal MD  Resident  09/21/21 2140    Attending Supervising Physician's [facility] [address] Statement  I performed a history and physical examination on the patient and discussed the management with the resident physician. I reviewed and agree with the findings and plan as documented in the resident physician note. This includes all diagnostic interpretations and treatment plans as written. I was present for the key portion of any procedures performed and the inclusive time noted in any critical care statement. I have reviewed and interpreted all available lab, radiology and ekg results available at the moment. Diagnosis, treatment and disposition plans were discussed and agreed upon by patient. This transcription was electronically signed. It was dictated by use of voice recognition software and electronically transcribed. The transcription may contain errors not detected in proofreading.        Electronically signed by Silke Eric DO on 9/22/21 at 1:07 AM ALINA Eric DO  09/22/21 0107

## 2021-09-22 NOTE — CONSULTS
[address] [place] [address]                                  CONSULTATION    PATIENT NAME: Juan Manuel Herrera                :        1975  MED REC NO:   [id number]                           ROOM:       0019  ACCOUNT NO:   [de-identified]                           ADMIT DATE: 2021  PROVIDER:     Jaleesa Candelario. KENDAL David Nury:  2021    REASON OF CONSULTATION:  Presentation with syncope and chest pain. HISTORY OF PRESENT ILLNESS:  This is a 73-year-old gentleman who was in  usual state of health until yesterday morning when he was having  breakfast and experienced sudden loss of consciousness. The patient was  with his sister, talking about his other sister who  and got  informed that morning and he was crying and then following that he was  trying to have breakfast and was eating and he suddenly then started to  have chest pain, sharp in nature like severe one, he grabbed his chest  and then he fell to the ground and passed out, and then basically family  called 911 and the patient was not having any pulse and basically CPR  was started by family member and 47 was called and after continued CPR  of four minutes, the pulse was obtained and the patient had return of  spontaneous circulation apparently and pulse could be felt, but the  patient remained unconscious until the squad came and put him on the  stretcher and he woke up within the ambulance and then he was fully  awake, conscious and was oriented and did not know how he got into the  ambulance. So, the patient stated that this was the first time he has  passed out. The patient stated that he has been having chest pain for  the last couple of weeks on and off every day almost and usually induced  with emotional stress.   He has also another sister who is having  end-stage liver disease and dying and so very much stressed with his  family situation and now his sister  suddenly from a cardiac issue. She is just 52 and  from coronary artery disease, myocardial  infarction per the information from the patient. So he is stressed  about all these and having recurrent chest pain. His work is physical  demanding, did not have any limitation on his work, but he is having  recurrent chest pain usually with emotional stress almost having it  every day and yesterday was different, so came in and has been  evaluated. No acute coronary syndrome. Troponin is negative and so  cardiology evaluation was sought for further evaluation and management  of the patient. The patient is chest pain free, stable and no history of previous  syncope, no dizziness, no warning symptom during the syncope and no  postdrome or prodrome. REVIEW OF SYSTEMS:  Negative except the above-mentioned ones. PAST MEDICAL HISTORY:  Includes history of renal stone. PAST SURGICAL HISTORY:  Lithotripsy. FAMILY HISTORY:  Significant family history of premature coronary artery  disease. His sister has a myocardial infarction,  at age 52 the one  who has been stressed and his mother has myocardial infarction and  coronary artery disease at 46,  from it. His father has coronary  artery disease in early [de-identified]. So, he has significant family history of  premature coronary artery disease. SOCIAL HISTORY:  He has never smoked. No alcohol. No drug use. Occasional alcohol consumption. No drug use. ALLERGIES:  HE IS ALLERGIC TO HYDROMORPHONE.    HOME MEDICATIONS:  None prior to this admission. PHYSICAL EXAMINATION:  GENERAL:  Looks sick, in no form of distress. VITAL SIGNS:  Blood pressure 135/94, pulse rate 64, respiratory rate 18,  and temperature 97.8. HEENT:  Pink conjunctivae. Anicteric sclerae. Pupils are equal and  reactive bilaterally to light. NECK:  No lymphadenopathy. No goiter. No JVD. CHEST:  Clear to auscultation.   CARDIOVASCULAR:  Arteries are felt; carotid, femoral, pedal.  No bruits. S1, S2 well heard. No murmur or galloped rhythm. ABDOMEN:  Soft, nontender, and nondistended. Bowel sounds are positive. GENITALIA:  No CVA, flank, or suprapubic tenderness. LOCOMOTOR:  No cyanosis, clubbing, muscle, or joint tenderness. SKIN:  No rashes, ulcers, or nodules. CNS:  Alert, awake, and oriented to time, person, and place. Cranial  nerves are intact. Sensation is intact to light touch and pain. Normal  muscle strength and tone. Appropriate mood and affect. WORKUP:  EKG sinus rhythm with no acute EKG abnormality and early  repolarization changes and his workup sodium was 142, potassium 3.7, BUN  21, creatinine 1.2. Troponin x3 less than 0.01. , triglyceride  162, total cholesterol 205. Chest x-ray _____ unremarkable. ASSESSMENT:  1. Syncopal episode with no warning symptom, but chest pain before  having syncope and the patient had some chest pain also after the  syncope in the emergency room after he arrived to the emergency room. 2.  Chest pain. Basically, recurrent chest pain over the last two weeks  and new onset and basically had chest pain yesterday before passing out  and chest pain after arrived to the emergency room. Chest pain free  overnight, so this is consistent with unstable angina, new recurrent  chest pain over the last two weeks. 3.  Family history of premature coronary artery disease. Sister had  coronary artery disease, myocardial infarction at 52; mother has  myocardial infarction at 46; so there is a significant family history of  premature coronary artery disease. 4.  Significant history of emotional stress in the last couple of weeks,  could be vasospasm related chest pain, but could be also significant  underlying coronary artery disease cannot be excluded in a gentleman  with significant family history of premature coronary artery disease  with recurrent chest pain and syncope with no warning symptom.     PLAN AND RECOMMENDATION:  1. At this time, we will get an echocardiogram and the patient needs a  cardiac catheterization in view of his recurrent chest pain over the  last two weeks, which is unstable angina, syncope with no warning  symptom and family history of premature coronary artery disease. The  patient needs cardiac catheterization. Risk and benefit has been  explained and the patient verbalized understanding and agreed with the  plan of care. 2.  Dyslipidemia. Dietary management will be recommended at this level  provided the cath finding becomes non-revealing. 3.  Provided that the cath finding non-revealing, the patient needs to  have an event monitor on discharge. 4.  I discussed with the patient in detail the plan of care and all his  questions have been answered to his satisfaction. Based on the course  and finding from the cardiac catheterization, we will gauge further  care. Thank you for allowing us to participate in the care of this patient. I  will follow up with you. Rodriguez Perez.  Mukund Flor M.D.    D: 09/22/2021 10:51:34       T: 09/22/2021 12:30:21     [email or web address]  Job#: [id number]     Doc#: [id number]    CC:

## 2021-09-22 NOTE — CONSULTS
Syncope- no warning  USA-chest pain recurrent for 2 weeks  FHX of premature cad    plan    Echo  Event monitor  Need cath    The risk and benefit of left heart cath has been explain in detail including but not limited to  Bleeding including retroperitoneal bleed 1%, infection, MI, CVA, ALEXANDRE, Limb loss, dissection, allergic reaction,death  Each of them 1 in 2000 range. The alternative managment has been explained. Patient expressed understanding of the risk and benefit and of the alternative managment well. Patient wanted and agreed to proceed with left heart cath.   Hence we will schedule him for     [id number]         Kirt Pitts MD

## 2021-09-22 NOTE — PROGRESS NOTES
Sedation/Analgesia History & Physical      Pt Name: Talat John  MRN: [id number]  YOB: 1975  Provider Performing Procedure: Finesse Bourne MD MD, [facility]  Primary Care Physician: Elicia Suazo MD      PRE-PROCEDURE   DNR-CCA/DNR-CC []Yes [x]No      PLANNED PROCEDURE     [x]Cath  [x]PCI                []Pacemaker/AICD  []VINAY             []Cardioversion []Peripheral angiography/PTA  []Other:        Consent:   The indication, risks and benefits of the procedure and possible therapeutic consequences and alternatives were discussed with the patient. The patient was given the opportunity to ask questions and to have them answered to his/her satisfaction. Risks of the procedure include but are not limited to the following: Bleeding, hematoma including retroperitoneal hematoma, infection, pain and discomfort, injury to the aorta and other blood vessels, rhythm disturbance, low blood pressure, myocardial infarction, stroke, kidney damage/failure, myocardial perforation, allergic reactions to sedatives/contrast material, loss of pulse/vascular compromise requiring surgery, aneurysm/pseudoaneurysm formation, possible loss of a limb/hand/leg, death. Alternatives to and omission of the suggested procedure were discussed. The patient had no further questions and wished to proceed; the consent form was signed. Indications for the Procedure:     Unstable angina   Syncope    Plan: [facility] possible PCI        MEDICAL HISTORY        Past Medical History:   Diagnosis Date    Kidney stones          Past Surgical History:   Procedure Laterality Date    LITHOTRIPSY             Allergies   Allergen Reactions    Dilaudid [Hydromorphone]          MEDICATIONS   Coumadin Use Last 5 Days [x]No []Yes  Antiplatelet drug therapy use last 5 days  []No [x]Yes  Other anticoagulant use last 5 days  [x]No []Yes      No current facility-administered medications on file prior to encounter.      No current outpatient medications on file prior to encounter.        Current Facility-Administered Medications   Medication Dose Route Frequency Provider Last Rate Last Admin    0.9 % sodium chloride infusion   IntraVENous Continuous Rowdy Redd MD        0.9 % sodium chloride infusion   IntraVENous Continuous Francisco Klinefelter,  mL/hr at 09/22/21 0240 New Bag at 09/22/21 0240    sodium chloride flush 0.9 % injection 5-40 mL  5-40 mL IntraVENous 2 times per day Francisco Klinefelter, MD   10 mL at 09/21/21 1954    sodium chloride flush 0.9 % injection 5-40 mL  5-40 mL IntraVENous PRN Francisco Klinefelter, MD        0.9 % sodium chloride infusion  25 mL IntraVENous PRN Francisco Klinefelter, MD        ondansetron (ZOFRAN-ODT) disintegrating tablet 4 mg  4 mg Oral Q8H PRN Francisco Klinefelter, MD        Or    [facility]ron [facility] injection 4 mg  4 mg IntraVENous Q6H PRN Francisco Klinefelter, MD        acetaminophen (TYLENOL) tablet 650 mg  650 mg Oral Q6H PRN Francisco Klinefelter, MD        Or    acetaminophen (TYLENOL) suppository 650 mg  650 mg Rectal Q6H PRN Francisco Klinefelter, MD        polyethylene glycol [facility]) packet 17 g  17 g Oral Daily PRN Francisco Klinefelter, MD        aspirin chewable tablet 81 mg  81 mg Oral Daily Francisco Klinefelter, MD   81 mg at 09/22/21 0835    metoprolol tartrate (LOPRESSOR) tablet 50 mg  50 mg Oral BID Francisco Klinefelter, MD   50 mg at 09/22/21 5932    morphine (PF) injection 2 mg  2 mg IntraVENous Q2H PRN Francisco Klinefelter, MD   2 mg at 09/22/21 1078    Or    morphine injection 4 mg  4 mg IntraVENous Q2H PRN Francisco Klinefelter, MD        LORazepam (ATIVAN) tablet 0.5 mg  0.5 mg Oral TID Francisco Klinefelter, MD   0.5 mg at 09/22/21 3360             PHYSICAL:     /80   Pulse 61   Temp 98.1 °F (36.7 °C) (Oral)   Resp 18   Ht 5' 9\" (1.753 m)   Wt 205 lb (93 kg)   SpO2 96%   BMI 30.27 kg/m²     Heart:  [x]Regular rate and rhythm  []Other:    Lungs:  [x]Clear    []Other:    Abdomen: [x]Soft    []Other:    Mental Status: [x]Alert & nursing sedation/analgesia documentation record)  [x]Formulation and discussion of sedation/procedure plan, risks, and expectations with patient and/or responsible adult completed. [x]Patient examined immediately prior to the procedure.  (Refer to nursing sedation/analgesia documentation record)        Brittni Flores MD MD, Coy Chatterjee,  Electronically signed 9/22/2021 at 2:11 PM

## 2021-09-23 ENCOUNTER — TELEPHONE (OUTPATIENT)
Dept: CARDIOLOGY CLINIC | Age: 46
End: 2021-09-23

## 2021-09-23 VITALS
DIASTOLIC BLOOD PRESSURE: 96 MMHG | TEMPERATURE: 98.4 F | RESPIRATION RATE: 20 BRPM | HEART RATE: 67 BPM | BODY MASS INDEX: 30.78 KG/M2 | HEIGHT: 69 IN | WEIGHT: 207.8 LBS | OXYGEN SATURATION: 94 % | SYSTOLIC BLOOD PRESSURE: 136 MMHG

## 2021-09-23 LAB
LV EF: 63 %
LVEF MODALITY: NORMAL

## 2021-09-23 PROCEDURE — 6360000002 HC RX W HCPCS: Performed by: INTERNAL MEDICINE

## 2021-09-23 PROCEDURE — G0378 HOSPITAL OBSERVATION PER HR: HCPCS

## 2021-09-23 PROCEDURE — 6370000000 HC RX 637 (ALT 250 FOR IP): Performed by: INTERNAL MEDICINE

## 2021-09-23 PROCEDURE — 93306 TTE W/DOPPLER COMPLETE: CPT

## 2021-09-23 PROCEDURE — 99239 HOSP IP/OBS DSCHRG MGMT >30: CPT | Performed by: FAMILY MEDICINE

## 2021-09-23 PROCEDURE — 2580000003 HC RX 258: Performed by: INTERNAL MEDICINE

## 2021-09-23 PROCEDURE — 99024 POSTOP FOLLOW-UP VISIT: CPT | Performed by: PHYSICIAN ASSISTANT

## 2021-09-23 PROCEDURE — 93458 L HRT ARTERY/VENTRICLE ANGIO: CPT | Performed by: INTERNAL MEDICINE

## 2021-09-23 PROCEDURE — 96376 TX/PRO/DX INJ SAME DRUG ADON: CPT

## 2021-09-23 RX ORDER — SODIUM CHLORIDE 0.9 % (FLUSH) 0.9 %
5-40 SYRINGE (ML) INJECTION EVERY 12 HOURS SCHEDULED
Status: CANCELLED | OUTPATIENT
Start: 2021-09-23

## 2021-09-23 RX ORDER — ASPIRIN 81 MG/1
81 TABLET, CHEWABLE ORAL DAILY
Qty: 30 TABLET | Refills: 0 | Status: SHIPPED | OUTPATIENT
Start: 2021-09-24

## 2021-09-23 RX ORDER — ONDANSETRON 2 MG/ML
4 INJECTION INTRAMUSCULAR; INTRAVENOUS EVERY 6 HOURS PRN
Status: CANCELLED | OUTPATIENT
Start: 2021-09-23

## 2021-09-23 RX ORDER — ATORVASTATIN CALCIUM 20 MG/1
40 TABLET, FILM COATED ORAL NIGHTLY
Qty: 30 TABLET | Refills: 0 | Status: SHIPPED | OUTPATIENT
Start: 2021-09-23

## 2021-09-23 RX ORDER — METOPROLOL TARTRATE 50 MG/1
25 TABLET, FILM COATED ORAL 2 TIMES DAILY
Qty: 60 TABLET | Refills: 0 | Status: SHIPPED | OUTPATIENT
Start: 2021-09-23

## 2021-09-23 RX ORDER — NITROGLYCERIN 0.4 MG/1
0.4 TABLET SUBLINGUAL EVERY 5 MIN PRN
Qty: 25 TABLET | Refills: 0 | Status: SHIPPED | OUTPATIENT
Start: 2021-09-23

## 2021-09-23 RX ORDER — ATORVASTATIN CALCIUM 20 MG/1
20 TABLET, FILM COATED ORAL NIGHTLY
Status: DISCONTINUED | OUTPATIENT
Start: 2021-09-23 | End: 2021-09-23 | Stop reason: HOSPADM

## 2021-09-23 RX ORDER — ACETAMINOPHEN 325 MG/1
650 TABLET ORAL EVERY 4 HOURS PRN
Status: CANCELLED | OUTPATIENT
Start: 2021-09-23

## 2021-09-23 RX ORDER — SODIUM CHLORIDE 9 MG/ML
25 INJECTION, SOLUTION INTRAVENOUS PRN
Status: CANCELLED | OUTPATIENT
Start: 2021-09-23

## 2021-09-23 RX ORDER — SODIUM CHLORIDE 0.9 % (FLUSH) 0.9 %
5-40 SYRINGE (ML) INJECTION PRN
Status: CANCELLED | OUTPATIENT
Start: 2021-09-23

## 2021-09-23 RX ADMIN — MORPHINE SULFATE 2 MG: 2 INJECTION, SOLUTION INTRAMUSCULAR; INTRAVENOUS at 04:06

## 2021-09-23 RX ADMIN — ASPIRIN 81 MG: 81 TABLET, CHEWABLE ORAL at 09:00

## 2021-09-23 RX ADMIN — SODIUM CHLORIDE, PRESERVATIVE FREE 10 ML: 5 INJECTION INTRAVENOUS at 09:00

## 2021-09-23 RX ADMIN — METOPROLOL TARTRATE 50 MG: 50 TABLET, FILM COATED ORAL at 09:00

## 2021-09-23 RX ADMIN — LORAZEPAM 0.5 MG: 0.5 TABLET ORAL at 09:00

## 2021-09-23 ASSESSMENT — PAIN DESCRIPTION - FREQUENCY: FREQUENCY: INTERMITTENT

## 2021-09-23 ASSESSMENT — PAIN DESCRIPTION - PROGRESSION
CLINICAL_PROGRESSION: NOT CHANGED

## 2021-09-23 ASSESSMENT — PAIN DESCRIPTION - PAIN TYPE: TYPE: ACUTE PAIN

## 2021-09-23 ASSESSMENT — PAIN SCALES - GENERAL
PAINLEVEL_OUTOF10: 6
PAINLEVEL_OUTOF10: 4

## 2021-09-23 ASSESSMENT — PAIN DESCRIPTION - DESCRIPTORS: DESCRIPTORS: SHARP

## 2021-09-23 ASSESSMENT — PAIN DESCRIPTION - ONSET: ONSET: ON-GOING

## 2021-09-23 ASSESSMENT — PAIN DESCRIPTION - LOCATION: LOCATION: CHEST

## 2021-09-23 NOTE — PROGRESS NOTES
Patient was prescribed a 30 day event monitor. EKG came to place it and with the patient not having insurance out of pocket pay would have been $900. Patient declined the order. Patient was educated by this nurse and EKG about the reason for the event monitor order. Patient states that with his job he would most likely not be compliant wearing it anyway. Patient does have follow up with cardiology in two weeks.

## 2021-09-23 NOTE — PROGRESS NOTES
CLINICAL PHARMACY: DISCHARGE MED RECONCILIATION/REVIEW    [facility] Select Patient?: No  Total # of Interventions Recommended: 0   -   Total # Interventions Accepted: 0  Intervention Severity:   - Level 1 Intervention Present?: No   - Level 2 #: 0   - Level 3 #: 0   Time Spent (min): 15    Additional Documentation:    Amelia Centeno PharmD 9/23/2021 1:15 PM

## 2021-09-23 NOTE — PROGRESS NOTES
Cardiology Progress Note      Patient:  Juvencio Orantes  YOB: 1975  MRN: [id number]   Acct: [de-identified]  Admit Date:  2021  Primary Cardiologist: none    Note per dr Hernandez Delay OF CONSULTATION:  Presentation with syncope and chest pain.     HISTORY OF PRESENT ILLNESS:  This is a 50-year-old gentleman who was in  usual state of health until yesterday morning when he was having  breakfast and experienced sudden loss of consciousness. The patient was  with his sister, talking about his other sister who  and got  informed that morning and he was crying and then following that he was  trying to have breakfast and was eating and he suddenly then started to  have chest pain, sharp in nature like severe one, he grabbed his chest  and then he fell to the ground and passed out, and then basically family  called 911 and the patient was not having any pulse and basically CPR  was started by family member and 47 was called and after continued CPR  of four minutes, the pulse was obtained and the patient had return of  spontaneous circulation apparently and pulse could be felt, but the  patient remained unconscious until the squad came and put him on the  stretcher and he woke up within the ambulance and then he was fully  awake, conscious and was oriented and did not know how he got into the  ambulance. So, the patient stated that this was the first time he has  passed out. The patient stated that he has been having chest pain for  the last couple of weeks on and off every day almost and usually induced  with emotional stress. He has also another sister who is having  end-stage liver disease and dying and so very much stressed with his  family situation and now his sister  suddenly from a cardiac issue. She is just 52 and  from coronary artery disease, myocardial  infarction per the information from the patient. So he is stressed  about all these and having recurrent chest pain.   His work is physical  demanding, did not have any limitation on his work, but he is having  recurrent chest pain usually with emotional stress almost having it  every day and yesterday was different, so came in and has been  evaluated. No acute coronary syndrome. Troponin is negative and so  cardiology evaluation was sought for further evaluation and management  of the patient.     The patient is chest pain free, stable and no history of previous  syncope, no dizziness, no warning symptom during the syncope and no  postdrome or prodrome. \"    Subjective (Events in last 24 hours): pt awake and alert. NAD. No cp or sob.   Tired today  On RA      Objective:   /81   Pulse 75   Temp 98.3 °F (36.8 °C) (Oral)   Resp 18   Ht 5' 9\" (1.753 m)   Wt 207 lb 12.8 oz (94.3 kg)   SpO2 97%   BMI 30.69 kg/m²        TELEMETRY: nsr    Physical Exam:  General Appearance: alert and oriented to person, place and time, in no acute distress  Cardiovascular: normal rate, regular rhythm, normal S1 and S2, no murmurs, rubs, clicks, or gallops, distal pulses intact, no carotid bruits, no JVD  Pulmonary/Chest: clear to auscultation bilaterally- no wheezes, rales or rhonchi, normal air movement, no respiratory distress  Abdomen: soft, non-tender, non-distended, normal bowel sounds, no masses Extremities: no cyanosis, clubbing or edema, pulse   Skin: warm and dry  Head: normocephalic and atraumatic  Eyes: pupils equal, round, and reactive to light  Neck: supple and non-tender without mass, no thyromegaly   Neurological: alert, oriented, normal speech, no focal findings or movement disorder noted  Right wrist - no hematoma, +2 radial pulse    Medications:    atorvastatin  20 mg Oral Nightly    sodium chloride flush  5-40 mL IntraVENous 2 times per day    aspirin  81 mg Oral Daily    metoprolol tartrate  50 mg Oral BID    LORazepam  0.5 mg Oral TID      sodium chloride      sodium chloride 100 mL/hr at 09/22/21 0240    sodium chloride       sodium chloride flush, 5-40 mL, PRN  sodium chloride, 25 mL, PRN  ondansetron, 4 mg, Q8H PRN   Or  ondansetron, 4 mg, Q6H PRN  acetaminophen, 650 mg, Q6H PRN   Or  acetaminophen, 650 mg, Q6H PRN  polyethylene glycol, 17 g, Daily PRN  morphine, 2 mg, Q2H PRN   Or  morphine, 4 mg, Q2H PRN      Lab Data:    Cardiac Enzymes:  No results for input(s): CKTOTAL, CKMB, CKMBINDEX, TROPONINI in the last 72 hours.     CBC:   Lab Results   Component Value Date    WBC 7.1 09/22/2021    RBC 4.62 09/22/2021    HGB 13.4 09/22/2021    HCT 42.1 09/22/2021     09/22/2021       CMP:    Lab Results   Component Value Date     09/22/2021    K 3.7 09/22/2021    K 4.0 09/21/2021     09/22/2021    CO2 26 09/22/2021    BUN 21 09/22/2021    CREATININE 1.2 09/22/2021    LABGLOM 65 09/22/2021    GLUCOSE 97 09/22/2021    CALCIUM 8.8 09/22/2021       Hepatic Function Panel:    Lab Results   Component Value Date    ALKPHOS 69 09/21/2021    ALT 27 09/21/2021    AST 24 09/21/2021    PROT 7.7 09/21/2021    BILITOT 0.9 09/21/2021    BILIDIR <0.2 09/21/2021    LABALBU 4.9 09/21/2021       Magnesium:  No results found for: MG    PT/INR:  No results found for: PROTIME, INR    HgBA1c:  No results found for: LABA1C    FLP:    Lab Results   Component Value Date    TRIG 163 09/22/2021    HDL 30 09/22/2021    LDLCALC 142 09/22/2021       TSH:  No results found for: TSH      Assessment:    syncope  Chest pain - noncardiac - trop neg x3  S/p cath 9/22/21 - nonobstructive CAD - dictation pending  dyslipidemia  Premature family hx CAD    Discussed with dr valera  Plan:    TTE pending  Cont asa 81 daily  Add lipitor 20 daily qhs  Cont BB  If TTE WNL, will see prn  F/up dr Kimber Donnelly 2 weeks  30 day event monitor upon dc         Electronically signed by Yenny Justin PA-C on 9/23/2021 at 11:30 AM

## 2021-09-23 NOTE — PROGRESS NOTES
Discharge teaching and instructions for diagnosis/procedure of syncope completed with patient using teachback method. AVS reviewed. Printed prescriptions given to patient. Patient voiced understanding regarding prescriptions, follow up appointments, and care of self at home. Discharged ambulatory and from home to  home with support per family. Patient left in stable condition.

## 2021-09-23 NOTE — CARE COORDINATION
9/23/21, 12:04 PM EDT    Discharge to home with wife. Will have event monitor. Denies needs. Patient goals/plan/ treatment preferences discussed by  and . Patient goals/plan/ treatment preferences reviewed with patient/ family. Patient/ family verbalize understanding of discharge plan and are in agreement with goal/plan/treatment preferences. Understanding was demonstrated using the teach back method. AVS provided by RN at time of discharge, which includes all necessary medical information pertaining to the patients current course of illness, treatment, post-discharge goals of care, and treatment preferences.

## 2021-09-23 NOTE — OP NOTE
[facility]  Sedation/Analgesia Post Sedation Record        Pt Name: Ariana Winters  MRN: [id number]  YOB: 1975  Procedure Performed By: Hortencia Schroeder MD MD, [address]  Primary Care Physician: Carmen Garcia MD    POST-PROCEDURE    Sedation/Anesthesia:  Local Anesthesia and IV Conscious Sedation with continuous O2 monitoring    Estimated Blood Loss: 10 cc     Specimens Removed:  [x]None []Other:      Disposition of Specimen:  []Pathology []Other        Complications:   [x]None Immediate []Other:       Procedure performed: Left heart cath    Post Procedure Diagnosis/Findings:  Non-obstructive Coronary Artery Disease        Recommendations:    Medical treatment  Routine post-cath care.                Hortencia Schroeder MD MD, [address]  Electronically signed 9/23/2021 at 8:39 AM

## 2021-09-23 NOTE — DISCHARGE SUMMARY
Hospitalist Discharge Summary        Patient: Talat John  YOB: 1975  MRN: [id number]   Acct: [de-identified]    Primary Care Physician: Elicia Suazo MD    Admit date  9/21/2021    Discharge date:  9/23/2021 12:00 PM  Discharge Diagnoses: Active Hospital Problems    Diagnosis Date Noted    Syncope and collapse [R55] 09/21/2021    Other chest pain [R07.89] 09/21/2021    Elevated BP without diagnosis of hypertension [R03.0] 09/21/2021       Code Status:  Full Code     Chief Complaint on presentation :- chest pain      Initial admission HPI as below:-  This is a pt generally healthy and according to his wife a hx of untreated hbp; he has a hx of kidney stones.  Today he was sitting at the dinner table when he grabbed his chest and passed out. He apparently had no warning.  The EMS was called and they apparently had difficulty getting a pulse.  cpr was performed. Angela Garcia documentation was sparse but a bp of 60/30 was recorded. Karoline Kamara was awake in the squad. Juris Aylin was no notation of color change, nausea or sweating.  He was brought to ER and ekg, trop, cta of the chest were unremarkable.  Just before I saw him he complained of sudden chest pain which subsided in 1-2 minutes; ekg at that time was unchanged.       While I was examining the pt a family member commented on numbness in his left hand and left facial assymetry.  Thus brain imaging will be performed.       Of note, the pt has had intermittent vaguely described chest pains over the past few months, often at the dinner table lasting a few minutes.  His wife notes him rubbing his chest. Karoline Kamara has had considerable stressors in the last few months, including the deaths of family members; he learned of his sister's demise just before this event today. Hospital problem list with assessment and plan updates:-  Syncope- suspect vasovagal based on bp reading; orthostatics.  Lot of stressors  1.  Elevated bp- needs long range observation and rx if remains elevated- will arrange outpt followup  2. Chest pain, nonspecific; consult Cardiology; will likely need stress testing  3. Perception of left facial assymetry ( I do not appreciate); brain imaging     PMH, PSH, SH, FHX reviewed in chart review snap shot in EPIC    Additional discharge final recommendations as below:-  Underwent LHC non obstructive, prescribed HILLT due to ASCVD risk > 10% with BB 25 mg bid and ASA daily, Nitro SL prn, no phosphodiesterase inhibitor use. lipid concerning for elevated LDH and low HDL, recommended increase physical activity with cardio. Prescribed event monitor upon discharge but patient denies as he doesn't have insurance and it will cost him a lot of money. No signs of infection at the arterial access site of [facility]. Discharge Nurse Juanito Dias RN) note at the time of discharge as below:-  Discharge teaching and instructions for diagnosis/procedure of syncope completed with patient using teachback method. AVS reviewed. Printed prescriptions given to patient. Patient voiced understanding regarding prescriptions, follow up appointments, and care of self at home. Discharged ambulatory and from home to  home with support per family. Patient left in stable condition. All appointments obtained for the patient at the day of discharge with other specialities including PCP in one week. All questions and concerns addressed. Patient verbalized understandings and was agreeable with discharge planning.     Physical Exam:-  Vitals:   Patient Vitals for the past 24 hrs:   BP Temp Temp src Pulse Resp SpO2 Weight   09/23/21 0845 137/81 98.3 °F (36.8 °C) Oral 75 18 97 %    09/23/21 0357 135/85 98 °F (36.7 °C) Oral 63 16 98 % 207 lb 12.8 oz (94.3 kg)   09/23/21 0040 131/84 97.7 °F (36.5 °C) Oral 65 16 96 %    09/22/21 1530 113/77 98.2 °F (36.8 °C) Oral 61 18 93 %      Weight:   Weight: 207 lb 12.8 oz (94.3 kg)   24 hour intake/output:     Intake/Output Summary (Last 24 hours) at 9/23/2021 1200  Last data filed at 9/23/2021 0357  Gross per 24 hour   Intake 163.7 ml   Output 0 ml   Net 163.7 ml       1. General appearance: No apparent distress, appears stated age and cooperative. 2. HEENT: Normal cephalic, atraumatic without obvious deformity. Pupils equal, round, and reactive to light. Extra ocular muscles intact. Conjunctivae/corneas clear. 3. Neck: Supple, with full range of motion. No jugular venous distention. Trachea midline. 4. Respiratory:  Normal respiratory effort. Clear to auscultation, bilaterally without Rales/Wheezes/Rhonchi. 5. Cardiovascular: Regular rate and rhythm with normal S1/S2 without murmurs, rubs or gallops. 6. Abdomen: Soft, non-tender, non-distended with normal bowel sounds. 7. Musculoskeletal:  No clubbing, cyanosis or edema bilaterally. 8. Skin: Skin color, texture, turgor normal.  No rashes or lesions. 9. Neurologic:  Neurovascularly intact without any focal sensory/motor deficits. Cranial nerves: II-XII intact, grossly non-focal.  10. Psychiatric: Alert and oriented, thought content appropriate, normal insight  11. Capillary Refill: Brisk,< 3 seconds   12.  Peripheral Pulses: +2 palpable, equal bilaterally       Discharge Medications:-      Medication List      START taking these medications    aspirin 81 MG chewable tablet  Take 1 tablet by mouth daily  Start taking on: September 24, 2021     atorvastatin 20 MG tablet  Commonly known as: LIPITOR  Take 2 tablets by mouth nightly     metoprolol tartrate 50 MG tablet  Commonly known as: LOPRESSOR  Take 0.5 tablets by mouth 2 times daily           Where to Get Your Medications      These medications were sent to [facility] , [address]  [address]  [address], [address] [address]    Phone: [phone]   · aspirin 81 MG chewable tablet  · atorvastatin 20 MG tablet  · metoprolol tartrate 50 MG tablet          Labs :-  Recent Results (from the past 72 hour(s))   EKG 12 Lead    Collection Time: 09/21/21 10:29 AM   Result Value Ref Range    Ventricular Rate 72 BPM    Atrial Rate 72 BPM    P-R Interval 152 ms    QRS Duration 84 ms    Q-T Interval 384 ms    QTc Calculation (Bazett) 420 ms    P Axis 28 degrees    R Axis 72 degrees    T Axis 11 degrees   CBC Auto Differential    Collection Time: 09/21/21 11:00 AM   Result Value Ref Range    WBC 7.1 4.8 - 10.8 thou/mm3    RBC 5.11 4.70 - 6.10 mill/mm3    Hemoglobin 15.3 14.0 - 18.0 gm/dl    Hematocrit 45.3 42.0 - 52.0 %    MCV 88.6 80.0 - 94.0 fL    MCH 29.9 26.0 - 33.0 pg    MCHC 33.8 32.2 - 35.5 gm/dl    RDW-CV 13.1 11.5 - 14.5 %    RDW-SD 42.2 35.0 - 45.0 fL    Platelets 211 494 - 658 thou/mm3    MPV 10.8 9.4 - 12.4 fL    Seg Neutrophils 50.8 %    Lymphocytes 35.3 %    Monocytes 11.3 %    Eosinophils 1.7 %    Basophils 0.6 %    Immature Granulocytes 0.3 %    Segs Absolute 3.6 1 - 7 thou/mm3    Lymphocytes Absolute 2.5 1.0 - 4.8 thou/mm3    Monocytes Absolute 0.8 0.4 - 1.3 thou/mm3    Eosinophils Absolute 0.1 0.0 - 0.4 thou/mm3    Basophils Absolute 0.0 0.0 - 0.1 thou/mm3    Immature Grans (Abs) 0.02 0.00 - 0.07 thou/mm3    nRBC 0 /100 wbc   Basic Metabolic Panel w/ Reflex to MG    Collection Time: 09/21/21 11:00 AM   Result Value Ref Range    Sodium 145 135 - 145 meq/L    Potassium reflex Magnesium 4.0 3.5 - 5.2 meq/L    Chloride 107 98 - 111 meq/L    CO2 26 23 - 33 meq/L    Glucose 102 70 - 108 mg/dL    BUN 23 (H) 7 - 22 mg/dL    CREATININE 1.1 0.4 - 1.2 mg/dL    Calcium 9.7 8.5 - 10.5 mg/dL   Hepatic Function Panel    Collection Time: 09/21/21 11:00 AM   Result Value Ref Range    Albumin 4.9 3.5 - 5.1 g/dL    Total Bilirubin 0.9 0.3 - 1.2 mg/dL    Bilirubin, Direct <0.2 0.0 - 0.3 mg/dL    Alkaline Phosphatase 69 38 - 126 U/L    AST 24 5 - 40 U/L    ALT 27 11 - 66 U/L    Total Protein 7.7 6.1 - 8.0 g/dL   Troponin    Collection Time: 09/21/21 11:00 AM   Result Value Ref Range    Troponin T < 0.010 ng/ml   Brain Natriuretic Peptide    Collection Time: 09/21/21 11:00 AM   Result Value Ref Range    Pro-BNP 13.1 0.0 - 450.0 pg/mL   Anion Gap    Collection Time: 09/21/21 11:00 AM   Result Value Ref Range    Anion Gap 12.0 8.0 - 16.0 meq/L   Osmolality    Collection Time: 09/21/21 11:00 AM   Result Value Ref Range    Osmolality Calc 292.6 275.0 - 300.0 mOsmol/kg   Glomerular Filtration Rate, Estimated    Collection Time: 09/21/21 11:00 AM   Result Value Ref Range    Est, Glom Filt Rate 72 (A) ml/min/1.73m2   Urinalysis with microscopic    Collection Time: 09/21/21 12:45 PM   Result Value Ref Range    Glucose, Urine NEGATIVE NEGATIVE mg/dl    Bilirubin Urine NEGATIVE NEGATIVE    Ketones, Urine NEGATIVE NEGATIVE    Specific Gravity, UA >1.030 (A) 1.002 - 1.030    Blood, Urine NEGATIVE NEGATIVE    pH, UA 7.0 5.0 - 9.0    Protein, UA NEGATIVE NEGATIVE mg/dl    Urobilinogen, Urine 1.0 0.0 - 1.0 eu/dl    Nitrite, Urine NEGATIVE NEGATIVE    Leukocyte Esterase, Urine TRACE (A) NEGATIVE    Color, UA YELLOW YELLOW-STRAW    Character, Urine CLEAR [id number]    RBC, UA NONE SEEN 0-2/hpf /hpf    WBC, UA 0-2 0-4/hpf /hpf    Epithelial Cells, UA NONE SEEN 3-5/hpf /hpf    Bacteria, UA NONE SEEN FEW/NONE SEEN    Casts NONE SEEN NONE SEEN /lpf    Crystals NONE SEEN NONE SEEN    Renal Epithelial, UA NONE SEEN NONE SEEN    Yeast, UA NONE SEEN NONE SEEN    Casts NONE SEEN /lpf    Miscellaneous Lab Test Result NONE SEEN    EKG Chest Pain    Collection Time: 09/21/21  2:44 PM   Result Value Ref Range    Ventricular Rate 85 BPM    Atrial Rate 85 BPM    P-R Interval 144 ms    QRS Duration 82 ms    Q-T Interval 356 ms    QTc Calculation (Bazett) 423 ms    P Axis 42 degrees    R Axis 62 degrees    T Axis 15 degrees   EKG 12 Lead    Collection Time: 09/21/21  4:40 PM   Result Value Ref Range    Ventricular Rate 70 BPM    Atrial Rate 70 BPM    P-R Interval 154 ms    QRS Duration 88 ms    Q-T Interval 394 ms    QTc Calculation (Bazett) 425 ms    P Axis 25 degrees    R Axis 59 degrees    T Axis 1 degrees   Troponin    Collection Time: 09/21/21  5:03 PM   Result Value Ref Range    Troponin T < 0.010 ng/ml   C-reactive protein    Collection Time: 09/21/21  7:48 PM   Result Value Ref Range    CRP < 0.30 0.00 - 1.00 mg/dl   Troponin    Collection Time: 09/21/21  7:48 PM   Result Value Ref Range    Troponin T < 0.010 ng/ml   EKG 12 lead    Collection Time: 09/22/21  4:23 AM   Result Value Ref Range    Ventricular Rate 59 BPM    Atrial Rate 59 BPM    P-R Interval 162 ms    QRS Duration 86 ms    Q-T Interval 418 ms    QTc Calculation (Bazett) 413 ms    P Axis 22 degrees    R Axis 50 degrees    T Axis 14 degrees   CBC    Collection Time: 09/22/21  5:54 AM   Result Value Ref Range    WBC 7.1 4.8 - 10.8 thou/mm3    RBC 4.62 (L) 4.70 - 6.10 mill/mm3    Hemoglobin 13.4 (L) 14.0 - 18.0 gm/dl    Hematocrit 42.1 42.0 - 52.0 %    MCV 91.1 80.0 - 94.0 fL    MCH 29.0 26.0 - 33.0 pg    MCHC 31.8 (L) 32.2 - 35.5 gm/dl    RDW-CV 12.9 11.5 - 14.5 %    RDW-SD 42.6 35.0 - 45.0 fL    Platelets 674 203 - 498 thou/mm3    MPV 10.6 9.4 - 12.4 fL   Basic Metabolic Panel    Collection Time: 09/22/21  5:54 AM   Result Value Ref Range    Sodium 142 135 - 145 meq/L    Potassium 3.7 3.5 - 5.2 meq/L    Chloride 108 98 - 111 meq/L    CO2 26 23 - 33 meq/L    Glucose 97 70 - 108 mg/dL    BUN 21 7 - 22 mg/dL    CREATININE 1.2 0.4 - 1.2 mg/dL    Calcium 8.8 8.5 - 10.5 mg/dL   Lipid Panel    Collection Time: 09/22/21  5:54 AM   Result Value Ref Range    Cholesterol, Total 205 (H) 100 - 199 mg/dL    Triglycerides 163 0 - 199 mg/dL    HDL 30 mg/dL    LDL Calculated 142 mg/dL   Anion Gap    Collection Time: 09/22/21  5:54 AM   Result Value Ref Range    Anion Gap 8.0 8.0 - 16.0 meq/L   Glomerular Filtration Rate, Estimated    Collection Time: 09/22/21  5:54 AM   Result Value Ref Range    Est, Glom Filt Rate 65 (A) ml/min/1.73m2        Microbiology:    Blood culture #1: No results found for: BC    Blood culture #2:No results found for: BLOODCULT2    Organism:  No results found for: LABGRAM    MRSA culture only:No results found for: [address]    Urine culture: No results found for: LABURIN  No results found for: ORG     Respiratory culture: No results found for: CULTRESP    Aerobic and Anaerobic :  No results found for: LABAERO  No results found for: LABANAE    Urinalysis:      Lab Results   Component Value Date    NITRU NEGATIVE 09/21/2021    WBCUA 0-2 09/21/2021    BACTERIA NONE SEEN 09/21/2021    RBCUA NONE SEEN 09/21/2021    BLOODU NEGATIVE 09/21/2021    SPECGRAV >1.030 09/21/2021    GLUCOSEU NEGATIVE 04/28/2016       Radiology:-  CTA HEAD W WO CONTRAST    Result Date: 9/21/2021  PROCEDURE: CTA HEAD W WO CONTRAST, CTA NECK W WO CONTRAST CLINICAL INFORMATION: numbness hand left, ALTERED LOC . COMPARISON: CT head from the same date. TECHNIQUE: Helical CT from the aortic arch through the head in arterial phase during fast bolus administration of 80 mL Isovue-370 injected in the left [facility] with multiplanar reconstructions to include volumetric maximum intensity projection sequences. All CT scans at this facility use dose modulation, iterative reconstruction, and/or weight-based dosing when appropriate to reduce radiation dose to as low as reasonably achievable. FINDINGS: CTA HEAD: Intracranial segments of internal carotid arteries are patent without flow-limiting stenosis or aneurysm. The bilateral middle cerebral and anterior cerebral arteries are patent without focal abnormality identified. The basilar artery is patent without focal stenosis or visualized aneurysm. The bilateral posterior cerebral and superior cerebellar arteries are patent without focal abnormality identified. Dural venous sinuses appear patent without focal filling defect. No focal areas of abnormal parenchymal or meningeal enhancement are identified. CTA NECK: There is a typical 3 vessel arch.  The brachiocephalic and left subclavian arteries are patent without focal stenosis. The common carotid arteries are patent without focal stenosis. There is noncalcified mural plaque at the left carotid bulb/proximal internal carotid artery without hemodynamic likely significant stenosis. The right carotid bulb is widely patent without stenosis. Cervical segments of internal carotid arteries are otherwise patent without focal stenosis. The left vertebral artery is mildly dominant. Vertebral arteries are patent throughout their course without focal stenosis. There is streak artifact from dental amalgam which partially obscures oral and perioral soft tissues. There is close apposition of the vocal cords limiting evaluation of the larynx. Given these caveats, mucosal surfaces of the aerodigestive tract are symmetric without focal nodular thickening or visualized mass. No cervical lymphadenopathy is identified. The parotid, submandibular and thyroid glands are unremarkable. The visualized portions of the lungs are clear. There are mild degenerative changes of the cervical spine. 1. No flow-limiting stenosis or aneurysm in the intracranial circulation. 2. Mild stenosis at the left carotid bulb which is not hemodynamically significant by NASCET criteria. **This report has been created using voice recognition software. It may contain minor errors which are inherent in voice recognition technology. ** Final report electronically signed by Dr. Unique Grace MD on 9/21/2021 4:31 PM    XR CHEST (2 VW)    Result Date: 9/21/2021  PROCEDURE: XR CHEST (2 VW) CLINICAL INFORMATION: post cpr, shortness of breath COMPARISON: 5/30/2012 TECHNIQUE: PA and lateral views of the chest performed. FINDINGS: POSTSURGICAL CHANGES: None. LINES/TUBES/MECHANICAL DEVICES: None. TRACHEA/HEART/MEDIASTINUM/HILUM: Unremarkable. LUNG BRIZUELA: Normal. OTHER: None. PNEUMOTHORAX: None. OSSEOUS STRUCTURES: No acute osseous abnormality.      1. Unremarkable PA and lateral views of the chest. **This report has been created using voice recognition software. It may contain minor errors which are inherent in voice recognition technology. ** Final report electronically signed by Dr. Julianna Landeros on 9/21/2021 11:51 AM    CT HEAD WO CONTRAST    Result Date: 9/21/2021  PROCEDURE: CT HEAD WO CONTRAST CLINICAL INFORMATION: Left hand numbness TECHNIQUE: CT scan of the head was performed from the vertex to the skull base without use of intravenous contrast. Axial images as well as coronal and sagittal reconstructions were obtained. All CT scans at this facility use dose modulation, iterative reconstruction, and/or weight-based dosing when appropriate to reduce radiation dose to as low as reasonably achievable. COMPARISON: None. FINDINGS: There is no mass effect, midline shift or acute hemorrhage. Ventricles and CSF spaces are within normal limits. Gray-white matter differentiation is preserved. Visualized orbits, paranasal sinuses and mastoid air cells are unremarkable. No mass effect or acute hemorrhage. **This report has been created using voice recognition software. It may contain minor errors which are inherent in voice recognition technology. ** Final report electronically signed by Dr. Анна Willard on 9/21/2021 3:52 PM    CTA NECK W WO CONTRAST    Result Date: 9/21/2021  PROCEDURE: CTA HEAD W WO CONTRAST, CTA NECK W WO CONTRAST CLINICAL INFORMATION: numbness hand left, ALTERED LOC . COMPARISON: CT head from the same date. TECHNIQUE: Helical CT from the aortic arch through the head in arterial phase during fast bolus administration of 80 mL Isovue-370 injected in the left [facility] with multiplanar reconstructions to include volumetric maximum intensity projection sequences. All CT scans at this facility use dose modulation, iterative reconstruction, and/or weight-based dosing when appropriate to reduce radiation dose to as low as reasonably achievable.  FINDINGS: CTA HEAD: Intracranial segments of internal carotid arteries are patent without flow-limiting stenosis or aneurysm. The bilateral middle cerebral and anterior cerebral arteries are patent without focal abnormality identified. The basilar artery is patent without focal stenosis or visualized aneurysm. The bilateral posterior cerebral and superior cerebellar arteries are patent without focal abnormality identified. Dural venous sinuses appear patent without focal filling defect. No focal areas of abnormal parenchymal or meningeal enhancement are identified. CTA NECK: There is a typical 3 vessel arch. The brachiocephalic and left subclavian arteries are patent without focal stenosis. The common carotid arteries are patent without focal stenosis. There is noncalcified mural plaque at the left carotid bulb/proximal internal carotid artery without hemodynamic likely significant stenosis. The right carotid bulb is widely patent without stenosis. Cervical segments of internal carotid arteries are otherwise patent without focal stenosis. The left vertebral artery is mildly dominant. Vertebral arteries are patent throughout their course without focal stenosis. There is streak artifact from dental amalgam which partially obscures oral and perioral soft tissues. There is close apposition of the vocal cords limiting evaluation of the larynx. Given these caveats, mucosal surfaces of the aerodigestive tract are symmetric without focal nodular thickening or visualized mass. No cervical lymphadenopathy is identified. The parotid, submandibular and thyroid glands are unremarkable. The visualized portions of the lungs are clear. There are mild degenerative changes of the cervical spine. 1. No flow-limiting stenosis or aneurysm in the intracranial circulation. 2. Mild stenosis at the left carotid bulb which is not hemodynamically significant by NASCET criteria. **This report has been created using voice recognition software.  It may contain minor errors which are inherent in voice recognition technology. ** Final report electronically signed by Dr. Teja Landaverde MD on 9/21/2021 4:31 PM    CTA CHEST W WO CONTRAST    Result Date: 9/21/2021  PROCEDURE: CTA CHEST W WO CONTRAST CLINICAL INFORMATION: abnormal ekg findings, syncope COMPARISON: No prior study. TECHNIQUE: 1.5 mm axial images were obtained through the chest after the administration of IV contrast. A non-contrast localizer was obtained. 2mm MIP reconstructions of the chest performed in coronal and sagittal planes. All CT scans at this facility use dose modulation, iterative reconstruction, and/or weight based dosing when appropriate to reduce the radiation dose to as low as reasonably achievable. CONTRAST: type and amount FINDINGS: POSTOPERATIVE CHANGES: None. LINES/TUBES/MECHANICAL DEVICES: None. HEART/MEDIASTINUM: 1. The heart size is normal. 2. There is no pericardial fluid or thickening. PULMONARY ARTERIES: 1. There is no pulmonary embolus. THORACIC AORTA: 1. There is no aneurysm or dissection. LUNG BRIZUELA - INFILTRATE/PLEURAL EFFUSION: 1. Normal. LUNG FIELDS - MASS/NODULE: 1. None. LYMPHADENOPATHY: 1. No pathologically enlarged lymphadenopathy. PNEUMOTHORAX: None. THYROID GLAND: Unremarkable. TRACHEA/ESOPHAGUS: Normal. MUSCULOSKELETAL: 1. There is no acute musculoskeletal abnormality. UPPER ABDOMEN: Unremarkable. OTHER: None. 1. There is no pulmonary embolus. 2. No infiltrate, effusion or suspicious pulmonary nodules. **This report has been created using voice recognition software. It may contain minor errors which are inherent in voice recognition technology. ** Final report electronically signed by Dr. Thais Onofre on 9/21/2021 12:22 PM       Follow-up scheduled after discharge :-    today with Adriane Cash MD  in the next few days     Consultations during this hospital stay:-  [] NONE [x] Cardiology  [] Nephrology  [] Hemo onco   [] GI   [] ID  [] Endocrine  [] Pulm    [] Neuro    [] Psych   [] Urology [] ENT   [] G SURGERY   []Ortho    []CV surg    [] Palliative  [] Hospice [] Pain management   []    []TCU   [] PT/OT  OTHERS:-     Disposition: home  Condition at Discharge: Stable    Discharge Medications:      Keli Gatrip   Home Medication Instructions TWE:[id number]    Printed on:09/23/21 1200   Medication Information                      aspirin 81 MG chewable tablet  Take 1 tablet by mouth daily             atorvastatin (LIPITOR) 20 MG tablet  Take 2 tablets by mouth nightly             metoprolol tartrate (LOPRESSOR) 50 MG tablet  Take 0.5 tablets by mouth 2 times daily                 Discharge Medications for PCI/MI (performed or attempted):   ASA:   yes    Statin:   yes  ACE/ARB:  Non obstructive cath   P2Y12 Inhibitor:  No stents   Beta Blocker:   yes  Nitro SL:   Yes prn   Cardiac Rehab:  Non obstructive cath  Dietary Consult:  Non obstructive cath     Time Spent:- 50 minutes    Electronically signed by Patricia Kennedy MD on 9/23/2021 at 12:00 PM  Discharging Hospitalist

## 2021-09-23 NOTE — PROCEDURES
Discussed with patient about cost of monitor with no insurance. Stated that they give a discount of $900. If ok, will apply monitor. Showed a 800 number for the company to work a payment plan and/or further discount due to financial situation. Patient states wasn't really interest in wearing it. States monitor will not stay on. Showed patient monitor and discussed techniques. Patient verbalize a no to wear until discuss with Dr Andres Gomez at later appt.

## 2021-10-04 NOTE — PROGRESS NOTES
CLINICAL PHARMACY NOTE: MEDS TO BEDS    Total # of Prescriptions Filled: 4   The following medications were delivered to the patient:  ASA 81mg Chewable  NTG 0.4mg SL  Metoprolol Tartrate 50mg  Atorvastatin 40mg    Additional Documentation:

## 2021-10-14 ENCOUNTER — TELEPHONE (OUTPATIENT)
Dept: CARDIOLOGY CLINIC | Age: 46
End: 2021-10-14

## 2021-10-14 NOTE — LETTER
[address] [facility]  [facility]  [address] [place] [address]  Phone: [phone]  Fax: [phone]      October 14, 2021     [place] [id number]      Dear Hosea Welch:    I am writing you because I have been informed of your missed appointment(s). We care about you and the management of your healthcare and want to make sure that you follow up as recommended. We're sorry you were unable to keep your appointment and hope that you are doing well. We would like to continue treating your healthcare needs. Please call the office to let us know your plans for treatment and to reschedule your appointment.      Sincerely,        Hilary Suarez MD

## 2023-05-16 ENCOUNTER — HOSPITAL ENCOUNTER (EMERGENCY)
Age: 48
Discharge: HOME | End: 2023-05-16

## 2023-05-16 ENCOUNTER — APPOINTMENT (OUTPATIENT)
Dept: GENERAL RADIOLOGY | Age: 48
End: 2023-05-16

## 2023-05-16 VITALS
SYSTOLIC BLOOD PRESSURE: 148 MMHG | TEMPERATURE: 97.4 F | OXYGEN SATURATION: 95 % | HEART RATE: 76 BPM | BODY MASS INDEX: 29.62 KG/M2 | WEIGHT: 200 LBS | RESPIRATION RATE: 18 BRPM | DIASTOLIC BLOOD PRESSURE: 88 MMHG | HEIGHT: 69 IN

## 2023-05-16 DIAGNOSIS — S61.311A LACERATION OF LEFT INDEX FINGER WITHOUT FOREIGN BODY WITH DAMAGE TO NAIL, INITIAL ENCOUNTER: Primary | ICD-10-CM

## 2023-05-16 DIAGNOSIS — S67.10XA CRUSHING INJURY OF FINGER, INITIAL ENCOUNTER: ICD-10-CM

## 2023-05-16 PROCEDURE — 73140 X-RAY EXAM OF FINGER(S): CPT

## 2023-05-16 PROCEDURE — 90471 IMMUNIZATION ADMIN: CPT | Performed by: NURSE PRACTITIONER

## 2023-05-16 PROCEDURE — 6360000002 HC RX W HCPCS: Performed by: NURSE PRACTITIONER

## 2023-05-16 PROCEDURE — 99214 OFFICE O/P EST MOD 30 MIN: CPT

## 2023-05-16 PROCEDURE — 99203 OFFICE O/P NEW LOW 30 MIN: CPT | Performed by: NURSE PRACTITIONER

## 2023-05-16 PROCEDURE — 6370000000 HC RX 637 (ALT 250 FOR IP): Performed by: NURSE PRACTITIONER

## 2023-05-16 PROCEDURE — 90715 TDAP VACCINE 7 YRS/> IM: CPT | Performed by: NURSE PRACTITIONER

## 2023-05-16 RX ORDER — HYDROCODONE BITARTRATE AND ACETAMINOPHEN 5; 325 MG/1; MG/1
1 TABLET ORAL EVERY 6 HOURS PRN
Qty: 10 TABLET | Refills: 0 | Status: SHIPPED | OUTPATIENT
Start: 2023-05-16 | End: 2023-05-19

## 2023-05-16 RX ORDER — HYDROCODONE BITARTRATE AND ACETAMINOPHEN 5; 325 MG/1; MG/1
1 TABLET ORAL ONCE
Status: COMPLETED | OUTPATIENT
Start: 2023-05-16 | End: 2023-05-16

## 2023-05-16 RX ORDER — CEPHALEXIN 500 MG/1
500 CAPSULE ORAL 4 TIMES DAILY
Qty: 28 CAPSULE | Refills: 0 | Status: SHIPPED | OUTPATIENT
Start: 2023-05-16 | End: 2023-05-23

## 2023-05-16 RX ADMIN — HYDROCODONE BITARTRATE AND ACETAMINOPHEN 1 TABLET: 5; 325 TABLET ORAL at 11:58

## 2023-05-16 RX ADMIN — TETANUS TOXOID, REDUCED DIPHTHERIA TOXOID AND ACELLULAR PERTUSSIS VACCINE, ADSORBED 0.5 ML: 5; 2.5; 8; 8; 2.5 SUSPENSION INTRAMUSCULAR at 11:59

## 2023-05-16 ASSESSMENT — ENCOUNTER SYMPTOMS
DIARRHEA: 0
BLOOD IN STOOL: 0
COUGH: 0
NAUSEA: 0
VOMITING: 0
EYE PAIN: 0
SINUS PRESSURE: 0
WHEEZING: 0
CONSTIPATION: 0
SHORTNESS OF BREATH: 0
ABDOMINAL PAIN: 0
RHINORRHEA: 0

## 2023-05-16 ASSESSMENT — PAIN - FUNCTIONAL ASSESSMENT: PAIN_FUNCTIONAL_ASSESSMENT: 0-10

## 2023-05-16 ASSESSMENT — PAIN SCALES - GENERAL: PAINLEVEL_OUTOF10: 10

## 2023-05-16 ASSESSMENT — PAIN DESCRIPTION - DESCRIPTORS: DESCRIPTORS: ACHING

## 2023-05-16 ASSESSMENT — PAIN DESCRIPTION - LOCATION: LOCATION: FINGER (COMMENT WHICH ONE)

## 2023-05-16 ASSESSMENT — PAIN DESCRIPTION - ORIENTATION: ORIENTATION: LEFT

## 2023-05-16 NOTE — ED NOTES
VS rechecked. Pt is still having pain in his finger. Denies any pain at injection site. Discharge instructions and prescriptions reviewed with pt. Pt verbalized understanding. Pt ambulated out in stable condition.        Lino Davalos RN  05/16/23 9051

## 2023-05-16 NOTE — ED PROVIDER NOTES
[facility] Encounter      [address]       Chief Complaint   Patient presents with    Hand Injury        Nurses Notes reviewed and I agree except as noted in the HPI. HISTORY OF PRESENT ILLNESS   Chloe Sanchez is a 53 y.o. male who presents to urgent care with complaint of injury to his left index ureter that occurred just prior to arrival.  Patient states that he smashed his finger with a hammer. The entire top layer of skin is removed on the dorsal aspect of the index finger from the DIP joint up. His nail appears to be completely removed from this injury as well. Patient denies other injuries including hitting his head. Patient denies numbness or tingling. Patient is able to move the wrist and fingers without difficulty. Radial pulse is 2+. Cap refill is less then 2 seconds at the nailbed. Sensation is intact to the fingers. REVIEW OF SYSTEMS     Review of Systems   Constitutional:  Negative for appetite change, chills, fatigue, fever and unexpected weight change. HENT:  Negative for ear pain, rhinorrhea and sinus pressure. Eyes:  Negative for pain and visual disturbance. Respiratory:  Negative for cough, shortness of breath and wheezing. Cardiovascular:  Negative for chest pain, palpitations and leg swelling. Gastrointestinal:  Negative for abdominal pain, blood in stool, constipation, diarrhea, nausea and vomiting. Genitourinary:  Negative for dysuria, frequency and hematuria. Musculoskeletal:  Positive for arthralgias (finger pain). Negative for joint swelling. Skin:  Negative for rash. Neurological:  Negative for dizziness, syncope, weakness, light-headedness and headaches. Hematological:  Does not bruise/bleed easily. PAST MEDICAL HISTORY         Diagnosis Date    Kidney stones        SURGICAL HISTORY     Patient  has a past surgical history that includes Lithotripsy.     CURRENT MEDICATIONS       Previous Medications

## 2023-07-03 ENCOUNTER — APPOINTMENT (OUTPATIENT)
Dept: GENERAL RADIOLOGY | Age: 48
End: 2023-07-03
Payer: OTHER GOVERNMENT

## 2023-07-03 ENCOUNTER — HOSPITAL ENCOUNTER (EMERGENCY)
Age: 48
Discharge: HOME | End: 2023-07-03
Attending: EMERGENCY MEDICINE
Payer: OTHER GOVERNMENT

## 2023-07-03 VITALS
BODY MASS INDEX: 30.31 KG/M2 | DIASTOLIC BLOOD PRESSURE: 97 MMHG | HEART RATE: 78 BPM | RESPIRATION RATE: 23 BRPM | SYSTOLIC BLOOD PRESSURE: 127 MMHG | OXYGEN SATURATION: 98 % | TEMPERATURE: 98.5 F | HEIGHT: 68 IN | WEIGHT: 200 LBS

## 2023-07-03 DIAGNOSIS — R07.89 ATYPICAL CHEST PAIN: Primary | ICD-10-CM

## 2023-07-03 LAB
ANION GAP SERPL CALC-SCNC: 18 MEQ/L (ref 8–16)
BASOPHILS ABSOLUTE: 0 THOU/MM3 (ref 0–0.1)
BASOPHILS NFR BLD AUTO: 0.7 %
BUN SERPL-MCNC: 24 MG/DL (ref 7–22)
CALCIUM SERPL-MCNC: 9.7 MG/DL (ref 8.5–10.5)
CHLORIDE SERPL-SCNC: 102 MEQ/L (ref 98–111)
CO2 SERPL-SCNC: 22 MEQ/L (ref 23–33)
CREAT SERPL-MCNC: 1.2 MG/DL (ref 0.4–1.2)
DEPRECATED RDW RBC AUTO: 39.9 FL (ref 35–45)
EKG ATRIAL RATE: 93 BPM
EKG P AXIS: 40 DEGREES
EKG P-R INTERVAL: 144 MS
EKG Q-T INTERVAL: 336 MS
EKG QRS DURATION: 84 MS
EKG QTC CALCULATION (BAZETT): 417 MS
EKG R AXIS: 55 DEGREES
EKG T AXIS: 15 DEGREES
EKG VENTRICULAR RATE: 93 BPM
EOSINOPHIL NFR BLD AUTO: 1.3 %
EOSINOPHILS ABSOLUTE: 0.1 THOU/MM3 (ref 0–0.4)
ERYTHROCYTE [DISTWIDTH] IN BLOOD BY AUTOMATED COUNT: 12.9 % (ref 11.5–14.5)
GFR SERPL CREATININE-BSD FRML MDRD: > 60 ML/MIN/1.73M2
GLUCOSE SERPL-MCNC: 143 MG/DL (ref 70–108)
HCT VFR BLD AUTO: 43.7 % (ref 42–52)
HGB BLD-MCNC: 14.8 GM/DL (ref 14–18)
IMM GRANULOCYTES # BLD AUTO: 0.03 THOU/MM3 (ref 0–0.07)
IMM GRANULOCYTES NFR BLD AUTO: 0.5 %
LYMPHOCYTES ABSOLUTE: 2.1 THOU/MM3 (ref 1–4.8)
LYMPHOCYTES NFR BLD AUTO: 35.2 %
MCH RBC QN AUTO: 29.2 PG (ref 26–33)
MCHC RBC AUTO-ENTMCNC: 33.9 GM/DL (ref 32.2–35.5)
MCV RBC AUTO: 86.4 FL (ref 80–94)
MONOCYTES ABSOLUTE: 0.5 THOU/MM3 (ref 0.4–1.3)
MONOCYTES NFR BLD AUTO: 8.4 %
NEUTROPHILS NFR BLD AUTO: 53.9 %
NRBC BLD AUTO-RTO: 0 /100 WBC
NT-PROBNP SERPL IA-MCNC: < 36 PG/ML (ref 0–124)
OSMOLALITY SERPL CALC.SUM OF ELEC: 289.6 MOSMOL/KG (ref 275–300)
PLATELET # BLD AUTO: 319 THOU/MM3 (ref 130–400)
PMV BLD AUTO: 10.3 FL (ref 9.4–12.4)
POTASSIUM SERPL-SCNC: 4.2 MEQ/L (ref 3.5–5.2)
RBC # BLD AUTO: 5.06 MILL/MM3 (ref 4.7–6.1)
SEGMENTED NEUTROPHILS ABSOLUTE COUNT: 3.3 THOU/MM3 (ref 1.8–7.7)
SODIUM SERPL-SCNC: 142 MEQ/L (ref 135–145)
TROPONIN T: < 0.01 NG/ML
TROPONIN T: < 0.01 NG/ML
WBC # BLD AUTO: 6.1 THOU/MM3 (ref 4.8–10.8)

## 2023-07-03 PROCEDURE — 96374 THER/PROPH/DIAG INJ IV PUSH: CPT

## 2023-07-03 PROCEDURE — 93005 ELECTROCARDIOGRAM TRACING: CPT | Performed by: EMERGENCY MEDICINE

## 2023-07-03 PROCEDURE — 71045 X-RAY EXAM CHEST 1 VIEW: CPT

## 2023-07-03 PROCEDURE — 83880 ASSAY OF NATRIURETIC PEPTIDE: CPT

## 2023-07-03 PROCEDURE — 6360000002 HC RX W HCPCS: Performed by: EMERGENCY MEDICINE

## 2023-07-03 PROCEDURE — 80048 BASIC METABOLIC PNL TOTAL CA: CPT

## 2023-07-03 PROCEDURE — 99285 EMERGENCY DEPT VISIT HI MDM: CPT

## 2023-07-03 PROCEDURE — 85025 COMPLETE CBC W/AUTO DIFF WBC: CPT

## 2023-07-03 PROCEDURE — 84484 ASSAY OF TROPONIN QUANT: CPT

## 2023-07-03 PROCEDURE — 36415 COLL VENOUS BLD VENIPUNCTURE: CPT

## 2023-07-03 RX ORDER — KETOROLAC TROMETHAMINE 30 MG/ML
15 INJECTION, SOLUTION INTRAMUSCULAR; INTRAVENOUS ONCE
Status: COMPLETED | OUTPATIENT
Start: 2023-07-03 | End: 2023-07-03

## 2023-07-03 RX ADMIN — KETOROLAC TROMETHAMINE 15 MG: 30 INJECTION, SOLUTION INTRAMUSCULAR; INTRAVENOUS at 16:17

## 2023-07-03 ASSESSMENT — PAIN - FUNCTIONAL ASSESSMENT
PAIN_FUNCTIONAL_ASSESSMENT: 0-10
PAIN_FUNCTIONAL_ASSESSMENT: NONE - DENIES PAIN

## 2023-07-03 ASSESSMENT — PAIN SCALES - GENERAL
PAINLEVEL_OUTOF10: 0
PAINLEVEL_OUTOF10: 2
PAINLEVEL_OUTOF10: 3

## 2023-07-03 ASSESSMENT — PAIN DESCRIPTION - DESCRIPTORS: DESCRIPTORS: PRESSURE

## 2023-07-03 ASSESSMENT — PAIN DESCRIPTION - LOCATION: LOCATION: CHEST

## 2023-07-03 NOTE — ED NOTES
Pt and vs reassessed. RR easy and unlabored. Dr. Bhanu Delgado at bedside to update pt.  Pt stable at this time     Ileana Weaver, [address]  07/03/23 0739

## 2023-07-03 NOTE — ED TRIAGE NOTES
Pt presents to the ED with c/o chest pain. Pt states pain started approx 2 hours pta. Pt states pain is a heavy pressure and 3 out of 10. States pain intermittently goes down his arms. States he has high blood pressure but stopped taking his medication. Family states he had a \"cardiac event around a year ago when his heart stopped and they had to do CPR\". Family states \"he was dealing with a death in his family\". Denies SOB. Pt states he had a migraine earlier today but took medication and pain has since decreased. RR easy and unlabored.  EKG complete

## 2023-07-03 NOTE — ED PROVIDER NOTES
[facility] EMERGENCY DEPT      CHIEF COMPLAINT       Chief Complaint   Patient presents with    Chest Pain       Nurses Notes reviewed and I agree except as noted in the HPI. HISTORY OF PRESENT ILLNESS    Liu Carlos is a 53 y.o. male who presents with complaint of chest pain with complaint of nonexertional chest pain, midsternal radiating to left and right towards midchest.  No history of CAD. Onset: Acute  Duration: Prior to arrival  Timing: Single event  Location of Pain: Chest midsternal  Intesity/severity: Mild  Modifying Factors: Family history of CAD  Relieved by;  Previous Episodes; Tx Before arrival: None  REVIEW OF SYSTEMS         PAST MEDICAL HISTORY    has a past medical history of Kidney stones. SURGICAL HISTORY      has a past surgical history that includes Lithotripsy. CURRENT MEDICATIONS       Discharge Medication List as of 7/3/2023  6:22 PM        CONTINUE these medications which have NOT CHANGED    Details   aspirin 81 MG chewable tablet Take 1 tablet by mouth daily, Disp-30 tablet, R-0Normal      atorvastatin (LIPITOR) 20 MG tablet Take 2 tablets by mouth nightly, Disp-30 tablet, R-0Normal      metoprolol tartrate (LOPRESSOR) 50 MG tablet Take 0.5 tablets by mouth 2 times daily, Disp-60 tablet, R-0Normal      nitroGLYCERIN (NITROSTAT) 0.4 MG SL tablet Place 1 tablet under the tongue every 5 minutes as needed for Chest pain up to max of 3 total doses. If no relief after 1 dose, call 911., Disp-25 tablet, R-0Normal             ALLERGIES     is allergic to dilaudid [hydromorphone]. FAMILY HISTORY     He indicated that his mother is . He indicated that his father is alive. family history includes Cancer in his father; Heart Disease in his father. SOCIAL HISTORY      reports that he has never smoked. He has never used smokeless tobacco. He reports current alcohol use. He reports that he does not use drugs.     PHYSICAL EXAM     INITIAL VITALS:  height is 5' 8\"

## 2023-07-03 NOTE — ED NOTES
Pt and vs reassessed. RR easy and unlabored. Pt resting in bed alert and updated on POC.  Pt stable at this time     Jaqueline Murillo  07/03/23 9164

## 2023-07-03 NOTE — ED NOTES
Pt and vs reassessed. RR easy and unlabored. Pt resting in bed alert and updated on POC.  Pt stable at this time     [place], [place]  07/03/23 5410

## 2023-07-05 ENCOUNTER — OFFICE VISIT (OUTPATIENT)
Dept: CARDIOLOGY CLINIC | Age: 48
End: 2023-07-05
Payer: OTHER GOVERNMENT

## 2023-07-05 VITALS
WEIGHT: 208 LBS | BODY MASS INDEX: 31.52 KG/M2 | SYSTOLIC BLOOD PRESSURE: 148 MMHG | HEART RATE: 88 BPM | DIASTOLIC BLOOD PRESSURE: 109 MMHG | HEIGHT: 68 IN

## 2023-07-05 DIAGNOSIS — R07.89 CHEST PAIN, ATYPICAL: Primary | ICD-10-CM

## 2023-07-05 DIAGNOSIS — Z82.49 FAMILY HISTORY OF PREMATURE CAD: ICD-10-CM

## 2023-07-05 DIAGNOSIS — R42 ORTHOSTATIC DIZZINESS: ICD-10-CM

## 2023-07-05 PROCEDURE — 99215 OFFICE O/P EST HI 40 MIN: CPT | Performed by: INTERNAL MEDICINE

## 2023-07-05 RX ORDER — NAPROXEN 500 MG/1
500 TABLET ORAL 2 TIMES DAILY WITH MEALS
COMMUNITY

## 2023-07-05 RX ORDER — OMEPRAZOLE 20 MG/1
20 CAPSULE, DELAYED RELEASE ORAL DAILY
COMMUNITY

## 2023-07-05 NOTE — PROGRESS NOTES
Chief Complaint   Patient presents with    Establish Cardiologist    New Patient    Follow-Up from Hospital     NP here - follow up from hospital ED- due to CP/Dizziness    EKG done 7-3-23    Seen in ER for cp  7/3/23  Had cp at work, sharp,  constant, 3/10, no associated  symptom, not exertion related, with localized tenderness,   Work as   Had some cp today 1/10  Nonradiating  Similar cp 2021    Had an episode of dizziness and nausea and sweaty  and resolved in 7/3/23  No syncope    Denied palpitation or edema    ORTHOSTATIC B/P DONE TODAY     [facility]  141/103 75 156/113 82      148/109 88    No recent syncope  None since sept 2021    Nonsmoker    'FHx  Mother had MI at [address]  Father had CABG at 79        Past Surgical History:   Procedure Laterality Date    LITHOTRIPSY         Allergies   Allergen Reactions    Dilaudid [Hydromorphone]         Family History   Problem Relation Age of Onset    Heart Disease Father     Cancer Father         Social History     Socioeconomic History    Marital status:      Spouse name: Not on file    Number of children: Not on file    Years of education: Not on file    Highest education level: Not on file   Occupational History    Not on file   Tobacco Use    Smoking status: Never    Smokeless tobacco: Never   Substance and Sexual Activity    Alcohol use: Yes     Comment: occassionally    Drug use: No    Sexual activity: Not on file   Other Topics Concern    Not on file   Social History Narrative    Not on file     Social Determinants of Health     Financial Resource Strain: Not on file   Food Insecurity: Not on file   Transportation Needs: Not on file   Physical Activity: Not on file   Stress: Not on file   Social Connections: Not on file   Intimate Partner Violence: Not on file   Housing Stability: Not on file       Current Outpatient Medications   Medication Sig Dispense Refill    naproxen (NAPROSYN) 500 MG tablet Take 1 tablet by mouth 2 times daily

## 2023-08-09 ENCOUNTER — TELEPHONE (OUTPATIENT)
Dept: CARDIOLOGY CLINIC | Age: 48
End: 2023-08-09

## 2023-08-09 NOTE — TELEPHONE ENCOUNTER
Patient no-showed his cardiac testing on 7/21/23 with no response to calls to try to reschedule, also no-showed his follow up office visit with Dr. Mayelin Carrillo on 8/07/23; if patient calls to reschedule, please consult with Dr. Mayelin Carrillo prior to scheduling.

## 2024-10-22 ENCOUNTER — APPOINTMENT (OUTPATIENT)
Dept: CT IMAGING | Age: 49
End: 2024-10-22

## 2024-10-22 ENCOUNTER — HOSPITAL ENCOUNTER (EMERGENCY)
Age: 49
Discharge: HOME | End: 2024-10-22
Attending: STUDENT IN AN ORGANIZED HEALTH CARE EDUCATION/TRAINING PROGRAM

## 2024-10-22 VITALS
DIASTOLIC BLOOD PRESSURE: 99 MMHG | OXYGEN SATURATION: 97 % | TEMPERATURE: 97.8 F | WEIGHT: 200 LBS | BODY MASS INDEX: 30.31 KG/M2 | SYSTOLIC BLOOD PRESSURE: 140 MMHG | HEIGHT: 68 IN | HEART RATE: 64 BPM | RESPIRATION RATE: 18 BRPM

## 2024-10-22 DIAGNOSIS — N20.0 KIDNEY STONE: ICD-10-CM

## 2024-10-22 DIAGNOSIS — R10.9 FLANK PAIN: Primary | ICD-10-CM

## 2024-10-22 LAB
ALBUMIN SERPL BCG-MCNC: 4.4 G/DL (ref 3.5–5.1)
ALP SERPL-CCNC: 65 U/L (ref 38–126)
ALT SERPL W/O P-5'-P-CCNC: 26 U/L (ref 11–66)
ANION GAP SERPL CALC-SCNC: 14 MEQ/L (ref 8–16)
AST SERPL-CCNC: 23 U/L (ref 5–40)
BACTERIA URNS QL MICRO: ABNORMAL /HPF
BASOPHILS ABSOLUTE: 0.1 THOU/MM3 (ref 0–0.1)
BASOPHILS NFR BLD AUTO: 0.7 %
BILIRUB SERPL-MCNC: 0.5 MG/DL (ref 0.3–1.2)
BILIRUB UR QL STRIP.AUTO: NEGATIVE
BUN SERPL-MCNC: 19 MG/DL (ref 7–22)
CALCIUM SERPL-MCNC: 9.8 MG/DL (ref 8.5–10.5)
CASTS #/AREA URNS LPF: ABNORMAL /LPF
CASTS 2: ABNORMAL /LPF
CHARACTER UR: CLEAR
CHLORIDE SERPL-SCNC: 105 MEQ/L (ref 98–111)
CO2 SERPL-SCNC: 26 MEQ/L (ref 23–33)
COLOR, UA: YELLOW
CREAT SERPL-MCNC: 1.1 MG/DL (ref 0.4–1.2)
CRYSTALS URNS MICRO: ABNORMAL
DEPRECATED RDW RBC AUTO: 42.3 FL (ref 35–45)
EKG ATRIAL RATE: 77 BPM
EKG P AXIS: 63 DEGREES
EKG P-R INTERVAL: 138 MS
EKG Q-T INTERVAL: 362 MS
EKG QRS DURATION: 82 MS
EKG QTC CALCULATION (BAZETT): 409 MS
EKG R AXIS: 80 DEGREES
EKG T AXIS: 46 DEGREES
EKG VENTRICULAR RATE: 77 BPM
EOSINOPHIL NFR BLD AUTO: 1.9 %
EOSINOPHILS ABSOLUTE: 0.1 THOU/MM3 (ref 0–0.4)
EPITHELIAL CELLS, UA: ABNORMAL /HPF
ERYTHROCYTE [DISTWIDTH] IN BLOOD BY AUTOMATED COUNT: 13 % (ref 11.5–14.5)
GFR SERPL CREATININE-BSD FRML MDRD: 83 ML/MIN/1.73M2
GLUCOSE SERPL-MCNC: 82 MG/DL (ref 70–108)
GLUCOSE UR QL STRIP.AUTO: NEGATIVE MG/DL
HCT VFR BLD AUTO: 43.3 % (ref 42–52)
HGB BLD-MCNC: 14.5 GM/DL (ref 14–18)
HGB UR QL STRIP.AUTO: ABNORMAL
IMM GRANULOCYTES # BLD AUTO: 0.02 THOU/MM3 (ref 0–0.07)
IMM GRANULOCYTES NFR BLD AUTO: 0.3 %
KETONES UR QL STRIP.AUTO: NEGATIVE
LIPASE SERPL-CCNC: 24.1 U/L (ref 5.6–51.3)
LYMPHOCYTES ABSOLUTE: 2.9 THOU/MM3 (ref 1–4.8)
LYMPHOCYTES NFR BLD AUTO: 40.1 %
MAGNESIUM SERPL-MCNC: 2 MG/DL (ref 1.6–2.4)
MCH RBC QN AUTO: 29.8 PG (ref 26–33)
MCHC RBC AUTO-ENTMCNC: 33.5 GM/DL (ref 32.2–35.5)
MCV RBC AUTO: 88.9 FL (ref 80–94)
MISCELLANEOUS 2: ABNORMAL
MONOCYTES ABSOLUTE: 0.8 THOU/MM3 (ref 0.4–1.3)
MONOCYTES NFR BLD AUTO: 11.5 %
NEUTROPHILS ABSOLUTE: 3.3 THOU/MM3 (ref 1.8–7.7)
NEUTROPHILS NFR BLD AUTO: 45.5 %
NITRITE UR QL STRIP: NEGATIVE
NRBC BLD AUTO-RTO: 0 /100 WBC
OSMOLALITY SERPL CALC.SUM OF ELEC: 290 MOSMOL/KG (ref 275–300)
PH UR STRIP.AUTO: 6 [PH] (ref 5–9)
PLATELET # BLD AUTO: 365 THOU/MM3 (ref 130–400)
PMV BLD AUTO: 10.4 FL (ref 9.4–12.4)
POTASSIUM SERPL-SCNC: 3.8 MEQ/L (ref 3.5–5.2)
PROT SERPL-MCNC: 7.3 G/DL (ref 6.1–8)
PROT UR STRIP.AUTO-MCNC: NEGATIVE MG/DL
RBC # BLD AUTO: 4.87 MILL/MM3 (ref 4.7–6.1)
RBC URINE: ABNORMAL /HPF
RENAL EPI CELLS #/AREA URNS HPF: ABNORMAL /[HPF]
SODIUM SERPL-SCNC: 145 MEQ/L (ref 135–145)
SP GR UR REFRACT.AUTO: > 1.03 (ref 1–1.03)
TROPONIN, HIGH SENSITIVITY: 10 NG/L (ref 0–12)
UROBILINOGEN, URINE: 1 EU/DL (ref 0–1)
WBC # BLD AUTO: 7.3 THOU/MM3 (ref 4.8–10.8)
WBC #/AREA URNS HPF: ABNORMAL /HPF
WBC #/AREA URNS HPF: NEGATIVE /[HPF]
YEAST LIKE FUNGI URNS QL MICRO: ABNORMAL

## 2024-10-22 PROCEDURE — 93010 ELECTROCARDIOGRAM REPORT: CPT | Performed by: NUCLEAR MEDICINE

## 2024-10-22 PROCEDURE — 96374 THER/PROPH/DIAG INJ IV PUSH: CPT

## 2024-10-22 PROCEDURE — 99285 EMERGENCY DEPT VISIT HI MDM: CPT

## 2024-10-22 PROCEDURE — 6360000002 HC RX W HCPCS: Performed by: EMERGENCY MEDICINE

## 2024-10-22 PROCEDURE — 80053 COMPREHEN METABOLIC PANEL: CPT

## 2024-10-22 PROCEDURE — 81001 URINALYSIS AUTO W/SCOPE: CPT

## 2024-10-22 PROCEDURE — 93005 ELECTROCARDIOGRAM TRACING: CPT | Performed by: EMERGENCY MEDICINE

## 2024-10-22 PROCEDURE — 96361 HYDRATE IV INFUSION ADD-ON: CPT

## 2024-10-22 PROCEDURE — 84484 ASSAY OF TROPONIN QUANT: CPT

## 2024-10-22 PROCEDURE — 2580000003 HC RX 258: Performed by: EMERGENCY MEDICINE

## 2024-10-22 PROCEDURE — 85025 COMPLETE CBC W/AUTO DIFF WBC: CPT

## 2024-10-22 PROCEDURE — 83735 ASSAY OF MAGNESIUM: CPT

## 2024-10-22 PROCEDURE — 83690 ASSAY OF LIPASE: CPT

## 2024-10-22 PROCEDURE — 6360000002 HC RX W HCPCS: Performed by: STUDENT IN AN ORGANIZED HEALTH CARE EDUCATION/TRAINING PROGRAM

## 2024-10-22 PROCEDURE — 6360000004 HC RX CONTRAST MEDICATION: Performed by: STUDENT IN AN ORGANIZED HEALTH CARE EDUCATION/TRAINING PROGRAM

## 2024-10-22 PROCEDURE — 6370000000 HC RX 637 (ALT 250 FOR IP): Performed by: EMERGENCY MEDICINE

## 2024-10-22 PROCEDURE — 96375 TX/PRO/DX INJ NEW DRUG ADDON: CPT

## 2024-10-22 PROCEDURE — 36415 COLL VENOUS BLD VENIPUNCTURE: CPT

## 2024-10-22 PROCEDURE — 74177 CT ABD & PELVIS W/CONTRAST: CPT

## 2024-10-22 RX ORDER — 0.9 % SODIUM CHLORIDE 0.9 %
1000 INTRAVENOUS SOLUTION INTRAVENOUS ONCE
Status: COMPLETED | OUTPATIENT
Start: 2024-10-22 | End: 2024-10-22

## 2024-10-22 RX ORDER — ONDANSETRON 2 MG/ML
4 INJECTION INTRAMUSCULAR; INTRAVENOUS ONCE
Status: COMPLETED | OUTPATIENT
Start: 2024-10-22 | End: 2024-10-22

## 2024-10-22 RX ORDER — FENTANYL CITRATE 50 UG/ML
100 INJECTION, SOLUTION INTRAMUSCULAR; INTRAVENOUS ONCE
Status: COMPLETED | OUTPATIENT
Start: 2024-10-22 | End: 2024-10-22

## 2024-10-22 RX ORDER — TAMSULOSIN HYDROCHLORIDE 0.4 MG/1
0.4 CAPSULE ORAL DAILY
Qty: 30 CAPSULE | Refills: 0 | Status: SHIPPED | OUTPATIENT
Start: 2024-10-22

## 2024-10-22 RX ORDER — IOPAMIDOL 755 MG/ML
80 INJECTION, SOLUTION INTRAVASCULAR
Status: COMPLETED | OUTPATIENT
Start: 2024-10-22 | End: 2024-10-22

## 2024-10-22 RX ORDER — FENTANYL CITRATE 50 UG/ML
1 INJECTION, SOLUTION INTRAMUSCULAR; INTRAVENOUS ONCE
Status: DISCONTINUED | OUTPATIENT
Start: 2024-10-22 | End: 2024-10-22

## 2024-10-22 RX ORDER — FENTANYL CITRATE 50 UG/ML
50 INJECTION, SOLUTION INTRAMUSCULAR; INTRAVENOUS ONCE
Status: COMPLETED | OUTPATIENT
Start: 2024-10-22 | End: 2024-10-22

## 2024-10-22 RX ORDER — TAMSULOSIN HYDROCHLORIDE 0.4 MG/1
0.4 CAPSULE ORAL ONCE
Status: COMPLETED | OUTPATIENT
Start: 2024-10-22 | End: 2024-10-22

## 2024-10-22 RX ORDER — ONDANSETRON 4 MG/1
4 TABLET, ORALLY DISINTEGRATING ORAL 3 TIMES DAILY PRN
Qty: 9 TABLET | Refills: 0 | Status: SHIPPED | OUTPATIENT
Start: 2024-10-22 | End: 2024-10-25

## 2024-10-22 RX ORDER — KETOROLAC TROMETHAMINE 30 MG/ML
15 INJECTION, SOLUTION INTRAMUSCULAR; INTRAVENOUS ONCE
Status: COMPLETED | OUTPATIENT
Start: 2024-10-22 | End: 2024-10-22

## 2024-10-22 RX ORDER — KETOROLAC TROMETHAMINE 10 MG/1
10 TABLET, FILM COATED ORAL EVERY 6 HOURS PRN
Qty: 12 TABLET | Refills: 0 | Status: SHIPPED | OUTPATIENT
Start: 2024-10-22 | End: 2024-10-25

## 2024-10-22 RX ADMIN — KETOROLAC TROMETHAMINE 15 MG: 30 INJECTION, SOLUTION INTRAMUSCULAR at 06:09

## 2024-10-22 RX ADMIN — FENTANYL CITRATE 100 MCG: 50 INJECTION, SOLUTION INTRAMUSCULAR; INTRAVENOUS at 05:11

## 2024-10-22 RX ADMIN — ONDANSETRON 4 MG: 2 INJECTION INTRAMUSCULAR; INTRAVENOUS at 04:37

## 2024-10-22 RX ADMIN — SODIUM CHLORIDE 1000 ML: 9 INJECTION, SOLUTION INTRAVENOUS at 05:14

## 2024-10-22 RX ADMIN — TAMSULOSIN HYDROCHLORIDE 0.4 MG: 0.4 CAPSULE ORAL at 05:15

## 2024-10-22 RX ADMIN — FENTANYL CITRATE 50 MCG: 50 INJECTION, SOLUTION INTRAMUSCULAR; INTRAVENOUS at 04:38

## 2024-10-22 RX ADMIN — FENTANYL CITRATE 50 MCG: 50 INJECTION, SOLUTION INTRAMUSCULAR; INTRAVENOUS at 06:09

## 2024-10-22 RX ADMIN — IOPAMIDOL 80 ML: 755 INJECTION, SOLUTION INTRAVENOUS at 05:45

## 2024-10-22 ASSESSMENT — PAIN - FUNCTIONAL ASSESSMENT: PAIN_FUNCTIONAL_ASSESSMENT: 0-10

## 2024-10-22 ASSESSMENT — PAIN SCALES - GENERAL
PAINLEVEL_OUTOF10: 7
PAINLEVEL_OUTOF10: 10
PAINLEVEL_OUTOF10: 7
PAINLEVEL_OUTOF10: 9
PAINLEVEL_OUTOF10: 10

## 2024-10-22 ASSESSMENT — PAIN DESCRIPTION - LOCATION: LOCATION: FLANK

## 2024-10-22 NOTE — ED NOTES
Pt medicated per MAR. Pt updated on POC. Pt denies being able to urinate at this time, urinal provided at bedside. Pt denies further needs at this time. Vitals collected. Pt breathing easy and unlabored. Call light in reach.

## 2024-10-22 NOTE — DISCHARGE INSTRUCTIONS
Please be sure to follow-up with your primary doctor, and attend your urology follow-up appointment on Thursday at 11 AM.

## 2024-10-22 NOTE — ED NOTES
Pt resting in cot at this time. Pt states that pain is starting to come back. Pt denies being able to urinate at this time. Vitals collected. Pt breathing easy and unlabored. Call light in reach.

## 2024-10-22 NOTE — ED TRIAGE NOTES
Pt to ED via private vehicle w/ report of left flank pain. Pt reports that he started to have flank pain on Saturday and then around midnight today the pain significantly increased. Pt reports hx of kidney stones and states that it feels like a kidney stone. A&O X 4. Call light in reach.

## 2024-10-23 NOTE — ED PROVIDER NOTES
thought it was appropriate to consider the following emergency medical conditions:    Kidney stone, renal infarct, dissection, constipation, shingles, diverticulitis, trop, EKG    PLAN: Urinalysis, CBC, CMP, CTAP,     Although some of these diagnoses are unlikely they were considered in my medical decision making.  Chronic Conditions considered:   Patient Active Problem List   Diagnosis    Syncope and collapse    Other chest pain    Elevated BP without diagnosis of hypertension    Chest pain, atypical- with left chest wall tenderness    Family history of premature CAD    Orthostatic dizziness       ED RESULTS   Laboratory results:  Labs Reviewed   URINE WITH REFLEXED MICRO - Abnormal; Notable for the following components:       Result Value    Specific Gravity, UA >1.030 (*)     Blood, Urine SMALL (*)     All other components within normal limits   CBC WITH AUTO DIFFERENTIAL   COMPREHENSIVE METABOLIC PANEL   MAGNESIUM   TROPONIN   LIPASE   ANION GAP   GLOMERULAR FILTRATION RATE, ESTIMATED   OSMOLALITY   URINALYSIS WITH REFLEX TO CULTURE       Radiologic studies results:  CT ABDOMEN PELVIS W IV CONTRAST Additional Contrast? None   Final Result   1. 1 mm ureterolithiasis of the distal left ureter. 2 mm ureterolithiasis    of the UVJ. Stones results in minimal left hydroureteronephrosis. 2 mm    nephrolithiasis of the left kidney   2. Colonic diverticulosis without diverticulitis.      This document has been electronically signed by: Willy Whitfield DO on    10/22/2024 06:38 AM      All CTs at this facility use dose modulation techniques and iterative    reconstructions, and/or weight-based dosing   when appropriate to reduce radiation to a low as reasonably achievable.          ED Medications administered this visit:   Medications   fentaNYL (SUBLIMAZE) injection 50 mcg (50 mcg IntraVENous Given 10/22/24 7017)   ondansetron (ZOFRAN) injection 4 mg (4 mg IntraVENous Given 10/22/24 3071)   sodium chloride 0.9 % bolus

## 2025-01-24 ENCOUNTER — APPOINTMENT (OUTPATIENT)
Dept: CT IMAGING | Age: 50
End: 2025-01-24

## 2025-01-24 ENCOUNTER — HOSPITAL ENCOUNTER (EMERGENCY)
Age: 50
Discharge: HOME | End: 2025-01-24
Attending: STUDENT IN AN ORGANIZED HEALTH CARE EDUCATION/TRAINING PROGRAM

## 2025-01-24 VITALS
HEART RATE: 88 BPM | TEMPERATURE: 97.7 F | DIASTOLIC BLOOD PRESSURE: 72 MMHG | RESPIRATION RATE: 17 BRPM | OXYGEN SATURATION: 99 % | SYSTOLIC BLOOD PRESSURE: 118 MMHG

## 2025-01-24 DIAGNOSIS — G43.109 MIGRAINE WITH AURA AND WITHOUT STATUS MIGRAINOSUS, NOT INTRACTABLE: Primary | ICD-10-CM

## 2025-01-24 PROCEDURE — 99284 EMERGENCY DEPT VISIT MOD MDM: CPT

## 2025-01-24 PROCEDURE — 96374 THER/PROPH/DIAG INJ IV PUSH: CPT

## 2025-01-24 PROCEDURE — 96375 TX/PRO/DX INJ NEW DRUG ADDON: CPT

## 2025-01-24 PROCEDURE — 6360000002 HC RX W HCPCS

## 2025-01-24 PROCEDURE — 70450 CT HEAD/BRAIN W/O DYE: CPT

## 2025-01-24 RX ORDER — DIPHENHYDRAMINE HYDROCHLORIDE 50 MG/ML
25 INJECTION INTRAMUSCULAR; INTRAVENOUS ONCE
Status: COMPLETED | OUTPATIENT
Start: 2025-01-24 | End: 2025-01-24

## 2025-01-24 RX ORDER — KETOROLAC TROMETHAMINE 30 MG/ML
30 INJECTION, SOLUTION INTRAMUSCULAR; INTRAVENOUS ONCE
Status: DISCONTINUED | OUTPATIENT
Start: 2025-01-24 | End: 2025-01-24 | Stop reason: HOSPADM

## 2025-01-24 RX ORDER — PROCHLORPERAZINE EDISYLATE 5 MG/ML
10 INJECTION INTRAMUSCULAR; INTRAVENOUS ONCE
Status: COMPLETED | OUTPATIENT
Start: 2025-01-24 | End: 2025-01-24

## 2025-01-24 RX ADMIN — DIPHENHYDRAMINE HYDROCHLORIDE 25 MG: 50 INJECTION INTRAMUSCULAR; INTRAVENOUS at 01:28

## 2025-01-24 RX ADMIN — PROCHLORPERAZINE EDISYLATE 10 MG: 5 INJECTION INTRAMUSCULAR; INTRAVENOUS at 01:27

## 2025-01-24 ASSESSMENT — PAIN DESCRIPTION - LOCATION: LOCATION: HEAD

## 2025-01-24 ASSESSMENT — TONOMETRY
OD_IOP_MMHG: 11
OS_IOP_MMHG: 11
IOP_HANDHELD: 1

## 2025-01-24 ASSESSMENT — PAIN - FUNCTIONAL ASSESSMENT
PAIN_FUNCTIONAL_ASSESSMENT: NONE - DENIES PAIN
PAIN_FUNCTIONAL_ASSESSMENT: 0-10

## 2025-01-24 ASSESSMENT — PAIN SCALES - GENERAL: PAINLEVEL_OUTOF10: 10

## 2025-01-24 NOTE — DISCHARGE INSTRUCTIONS
You were seen and evaluated in the emergency department today for headache with vision change.  Your CT scan of your head had no abnormalities.  Your headache and vision improved after receiving a migraine cocktail.  We believe that you are having a migraine with aura today.    Follow-up with your primary care provider for further evaluation and management.    Return to emergency department anytime for acute or worrisome changes.

## 2025-01-24 NOTE — ED TRIAGE NOTES
Patient presents to ED with chief complaint of migraine. Patient states that it has been ongoing for 3 days now. Patient has no history of migraines. Patient resting in bed. Respirations easy and unlabored. No distress noted. Call light within reach.

## 2025-01-24 NOTE — ED NOTES
On arrival to room patient states his migraine has been ongoing for x3 days. Patient reports vision changes bilaterally. Patient states his vision is occluded on the bottom half and he is experiencing double vision on the top half. NIH assessed.

## 2025-01-24 NOTE — ED PROVIDER NOTES
time of this patient ED visit)      Radiologic studies results available at the moment of this note (None if blank):  CT HEAD WO CONTRAST   Final Result   1. No acute intracranial process.      This document has been electronically signed by: Willy Whitfield DO on    01/24/2025 01:33 AM      All CTs at this facility use dose modulation techniques and iterative    reconstructions, and/or weight-based dosing   when appropriate to reduce radiation to a low as reasonably achievable.        See ED course below for my interpretation if applicable.  All radiology images independently reviewed by me in the clinical context of this patient, in addition to interpretation provided by the radiologist.      EKG interpretation (none if blank):      All EKG results are individually reviewed and interpreted by me in the clinical context of this patient.  All EKGs are also interpreted by our Cardiology department, final interpretation may not be available as of the writing of this note.      MEDICAL DECISION MAKING   Initial Assessment Summary:   Patient 49-year-old male present to emergency department today for headache, vision change.  Please see ED course section below for continuation and resolution of this initial assessment if applicable.      Comorbid conditions pertinent to this ED encounter:  Previous headaches    Differential Diagnosis includes but is not limited to:  Intracranial bleed  Intracranial mass  Migraine with aura  No sign closed angle-closure glaucoma intraocular pressures 11 both sides      Decision Rules/Clinical Scores utilized:        Plan:   CT head, migraine cocktail      Code Status:  Not addressed during this ED visit    Social determinants of health impacting treatment or disposition:  Not Applicable.      PREVIOUS RECORDS  AND EXTERNAL INFORMATION REVIEWED   History obtained from: chart review and the patient.    Pertinent previous and/or external records reviewed: Noncontributory.    Case discussed

## 2025-03-16 ENCOUNTER — HOSPITAL ENCOUNTER (EMERGENCY)
Age: 50
Discharge: HOME | End: 2025-03-16
Attending: EMERGENCY MEDICINE

## 2025-03-16 VITALS
HEART RATE: 79 BPM | OXYGEN SATURATION: 99 % | SYSTOLIC BLOOD PRESSURE: 136 MMHG | DIASTOLIC BLOOD PRESSURE: 97 MMHG | RESPIRATION RATE: 15 BRPM | TEMPERATURE: 97.7 F

## 2025-03-16 DIAGNOSIS — R10.13 ABDOMINAL PAIN, EPIGASTRIC: Primary | ICD-10-CM

## 2025-03-16 DIAGNOSIS — R11.2 NAUSEA AND VOMITING, UNSPECIFIED VOMITING TYPE: ICD-10-CM

## 2025-03-16 LAB
ALBUMIN SERPL BCG-MCNC: 4.2 G/DL (ref 3.4–4.9)
ALP SERPL-CCNC: 74 U/L (ref 40–129)
ALT SERPL W/O P-5'-P-CCNC: 29 U/L (ref 10–50)
ANION GAP SERPL CALC-SCNC: 10 MEQ/L (ref 8–16)
APTT PPP: 34.9 SECONDS (ref 22–38)
AST SERPL-CCNC: 29 U/L (ref 10–50)
BASOPHILS ABSOLUTE: 0 THOU/MM3 (ref 0–0.1)
BASOPHILS NFR BLD AUTO: 0.5 %
BILIRUB SERPL-MCNC: 0.6 MG/DL (ref 0.3–1.2)
BILIRUB UR QL STRIP.AUTO: NEGATIVE
BUN SERPL-MCNC: 23 MG/DL (ref 8–23)
CALCIUM SERPL-MCNC: 9.7 MG/DL (ref 8.6–10)
CHARACTER UR: CLEAR
CHLORIDE SERPL-SCNC: 108 MEQ/L (ref 98–111)
CO2 SERPL-SCNC: 26 MEQ/L (ref 22–29)
COLOR, UA: YELLOW
CREAT SERPL-MCNC: 1.1 MG/DL (ref 0.7–1.2)
DEPRECATED RDW RBC AUTO: 41.7 FL (ref 35–45)
EKG ATRIAL RATE: 74 BPM
EKG P AXIS: 36 DEGREES
EKG P-R INTERVAL: 136 MS
EKG Q-T INTERVAL: 360 MS
EKG QRS DURATION: 86 MS
EKG QTC CALCULATION (BAZETT): 399 MS
EKG R AXIS: 71 DEGREES
EKG T AXIS: 48 DEGREES
EKG VENTRICULAR RATE: 74 BPM
EOSINOPHIL NFR BLD AUTO: 1.1 %
EOSINOPHILS ABSOLUTE: 0.1 THOU/MM3 (ref 0–0.4)
ERYTHROCYTE [DISTWIDTH] IN BLOOD BY AUTOMATED COUNT: 13.2 % (ref 11.5–14.5)
FLUAV RNA RESP QL NAA+PROBE: NOT DETECTED
FLUBV RNA RESP QL NAA+PROBE: NOT DETECTED
GFR SERPL CREATININE-BSD FRML MDRD: 82 ML/MIN/1.73M2
GLUCOSE SERPL-MCNC: 64 MG/DL (ref 74–109)
GLUCOSE UR QL STRIP.AUTO: NEGATIVE MG/DL
HCT VFR BLD AUTO: 42.9 % (ref 42–52)
HGB BLD-MCNC: 14.4 GM/DL (ref 14–18)
HGB UR QL STRIP.AUTO: NEGATIVE
IMM GRANULOCYTES # BLD AUTO: 0.02 THOU/MM3 (ref 0–0.07)
IMM GRANULOCYTES NFR BLD AUTO: 0.3 %
INR PPP: 0.97 (ref 0.85–1.13)
KETONES UR QL STRIP.AUTO: ABNORMAL
LIPASE SERPL-CCNC: 21 U/L (ref 13–60)
LYMPHOCYTES ABSOLUTE: 1.9 THOU/MM3 (ref 1–4.8)
LYMPHOCYTES NFR BLD AUTO: 28.6 %
MAGNESIUM SERPL-MCNC: 2 MG/DL (ref 1.6–2.6)
MCH RBC QN AUTO: 29.3 PG (ref 26–33)
MCHC RBC AUTO-ENTMCNC: 33.6 GM/DL (ref 32.2–35.5)
MCV RBC AUTO: 87.4 FL (ref 80–94)
MONOCYTES ABSOLUTE: 0.8 THOU/MM3 (ref 0.4–1.3)
MONOCYTES NFR BLD AUTO: 11.9 %
NEUTROPHILS ABSOLUTE: 3.8 THOU/MM3 (ref 1.8–7.7)
NEUTROPHILS NFR BLD AUTO: 57.6 %
NITRITE UR QL STRIP: NEGATIVE
NRBC BLD AUTO-RTO: 0 /100 WBC
NT-PROBNP SERPL IA-MCNC: < 36 PG/ML (ref 0–124)
OSMOLALITY SERPL CALC.SUM OF ELEC: 288.6 MOSMOL/KG (ref 275–300)
PH UR STRIP.AUTO: 5.5 [PH] (ref 5–9)
PLATELET # BLD AUTO: 306 THOU/MM3 (ref 130–400)
PMV BLD AUTO: 10.3 FL (ref 9.4–12.4)
POTASSIUM SERPL-SCNC: 4.2 MEQ/L (ref 3.5–5.2)
PROT SERPL-MCNC: 6.9 G/DL (ref 6.4–8.3)
PROT UR STRIP.AUTO-MCNC: NEGATIVE MG/DL
RBC # BLD AUTO: 4.91 MILL/MM3 (ref 4.7–6.1)
SARS-COV-2 RNA RESP QL NAA+PROBE: NOT DETECTED
SODIUM SERPL-SCNC: 144 MEQ/L (ref 135–145)
SP GR UR REFRACT.AUTO: 1.03 (ref 1–1.03)
TROPONIN, HIGH SENSITIVITY: < 6 NG/L (ref 0–12)
UROBILINOGEN, URINE: 1 EU/DL (ref 0–1)
WBC # BLD AUTO: 6.6 THOU/MM3 (ref 4.8–10.8)
WBC #/AREA URNS HPF: NEGATIVE /[HPF]

## 2025-03-16 PROCEDURE — 85025 COMPLETE CBC W/AUTO DIFF WBC: CPT

## 2025-03-16 PROCEDURE — 84484 ASSAY OF TROPONIN QUANT: CPT

## 2025-03-16 PROCEDURE — 80053 COMPREHEN METABOLIC PANEL: CPT

## 2025-03-16 PROCEDURE — 83690 ASSAY OF LIPASE: CPT

## 2025-03-16 PROCEDURE — 96375 TX/PRO/DX INJ NEW DRUG ADDON: CPT

## 2025-03-16 PROCEDURE — 93005 ELECTROCARDIOGRAM TRACING: CPT | Performed by: EMERGENCY MEDICINE

## 2025-03-16 PROCEDURE — 83735 ASSAY OF MAGNESIUM: CPT

## 2025-03-16 PROCEDURE — 87636 SARSCOV2 & INF A&B AMP PRB: CPT

## 2025-03-16 PROCEDURE — 85730 THROMBOPLASTIN TIME PARTIAL: CPT

## 2025-03-16 PROCEDURE — 81003 URINALYSIS AUTO W/O SCOPE: CPT

## 2025-03-16 PROCEDURE — 6370000000 HC RX 637 (ALT 250 FOR IP): Performed by: EMERGENCY MEDICINE

## 2025-03-16 PROCEDURE — 6360000002 HC RX W HCPCS: Performed by: EMERGENCY MEDICINE

## 2025-03-16 PROCEDURE — 36415 COLL VENOUS BLD VENIPUNCTURE: CPT

## 2025-03-16 PROCEDURE — 96374 THER/PROPH/DIAG INJ IV PUSH: CPT

## 2025-03-16 PROCEDURE — 2580000003 HC RX 258: Performed by: EMERGENCY MEDICINE

## 2025-03-16 PROCEDURE — 85610 PROTHROMBIN TIME: CPT

## 2025-03-16 PROCEDURE — 99284 EMERGENCY DEPT VISIT MOD MDM: CPT

## 2025-03-16 PROCEDURE — 83880 ASSAY OF NATRIURETIC PEPTIDE: CPT

## 2025-03-16 RX ORDER — 0.9 % SODIUM CHLORIDE 0.9 %
1000 INTRAVENOUS SOLUTION INTRAVENOUS ONCE
Status: COMPLETED | OUTPATIENT
Start: 2025-03-16 | End: 2025-03-16

## 2025-03-16 RX ORDER — ONDANSETRON 4 MG/1
4 TABLET, FILM COATED ORAL 3 TIMES DAILY PRN
Qty: 15 TABLET | Refills: 0 | Status: SHIPPED | OUTPATIENT
Start: 2025-03-16

## 2025-03-16 RX ORDER — PANTOPRAZOLE SODIUM 40 MG/1
40 TABLET, DELAYED RELEASE ORAL
Qty: 90 TABLET | Refills: 1 | Status: SHIPPED | OUTPATIENT
Start: 2025-03-16

## 2025-03-16 RX ORDER — ONDANSETRON 2 MG/ML
4 INJECTION INTRAMUSCULAR; INTRAVENOUS ONCE
Status: COMPLETED | OUTPATIENT
Start: 2025-03-16 | End: 2025-03-16

## 2025-03-16 RX ORDER — PANTOPRAZOLE SODIUM 40 MG/10ML
40 INJECTION, POWDER, LYOPHILIZED, FOR SOLUTION INTRAVENOUS DAILY
Status: DISCONTINUED | OUTPATIENT
Start: 2025-03-16 | End: 2025-03-16 | Stop reason: HOSPADM

## 2025-03-16 RX ADMIN — PANTOPRAZOLE SODIUM 40 MG: 40 INJECTION, POWDER, FOR SOLUTION INTRAVENOUS at 15:21

## 2025-03-16 RX ADMIN — ONDANSETRON 4 MG: 2 INJECTION, SOLUTION INTRAMUSCULAR; INTRAVENOUS at 15:21

## 2025-03-16 RX ADMIN — LIDOCAINE HYDROCHLORIDE: 20 SOLUTION ORAL at 15:21

## 2025-03-16 RX ADMIN — SODIUM CHLORIDE 1000 ML: 0.9 INJECTION, SOLUTION INTRAVENOUS at 15:25

## 2025-03-16 ASSESSMENT — PAIN DESCRIPTION - PAIN TYPE: TYPE: ACUTE PAIN

## 2025-03-16 ASSESSMENT — PAIN DESCRIPTION - LOCATION: LOCATION: ABDOMEN

## 2025-03-16 ASSESSMENT — PAIN SCALES - GENERAL: PAINLEVEL_OUTOF10: 3

## 2025-03-16 ASSESSMENT — PAIN DESCRIPTION - DESCRIPTORS: DESCRIPTORS: ACHING

## 2025-03-16 NOTE — ED NOTES
Patient presents to the Emergency Department via self. Patient presents with a complaint of hematochezia noted for past four days with dark tarry stools. Patient states that he has been battling flu like s/s for the past four days. EKG obtained and IV inserted. Patient is alert and oriented x4, patient does not appear in acute distress upon triage. Patient respirations are regular and unlabored with even rise and fall of chest. Patient family at the bedside. N/V; Diarrhea; chills; body aches; dizziness. Intermittent pain noted in mid epigastric region Call light within reach.

## 2025-03-16 NOTE — DISCHARGE INSTRUCTIONS
You were seen in the emergency department for abdominal pain, nausea, and vomiting.  In the ER, your blood work was normal apart from having low blood glucose because you have not been eating.  Most likely, you have a viral gastroenteritis, however, you may also have a gastric ulcer and should see a gastroenterologist in the near future.  Referral is attached.  If your symptoms get worse, return to the ER.

## 2025-03-16 NOTE — ED NOTES
This RN at bedside at this time. Pt educated on medication at this time. No acute distress noted. Call light in reach.

## 2025-03-19 NOTE — ED PROVIDER NOTES
[facility]  EMERGENCY MEDICINE ATTENDING ATTESTATION      Evaluation of Yahirmichelerory Burgess.   Case discussed and care plan developed with resident physician.   I agree with the resident physician documentation and plan as documented by her, except if my documentation differs.   Patient seen, interviewed and examined by me.  I reviewed the medical, surgical, family and social history, medications and allergies.   I have reviewed and interpreted all available lab, radiology and ekg results available at the moment.  I have reviewed the nursing documentation.       Please see the resident physician completed note for final disposition except as documented on this attestation.   I have reviewed and interpreted all available lab, radiology and ekg results available at the moment.  Diagnosis, treatment and disposition plans were discussed and agreed upon by patient.   This transcription was electronically signed. It was dictated by use of voice recognition software and electronically transcribed. The transcription may contain errors not detected in proofreading.     I performed direct supervision and was present for the critical portion following procedures: None  Critical care time on this case: None    Electronically signed by Enoch Barry MD on 3/16/25 at 4:28 PM EDT        Enoch Barry MD  03/16/25 2990    
dictated by use of voice recognition software and electronically transcribed, and parts may have been transcribed with the assistance of an ED scribe. The transcription may contain errors not detected in proofreading.    Electronically Signed: Brandy Heard MD, 03/19/25, 11:17 AM

## 2025-05-05 ENCOUNTER — HOSPITAL ENCOUNTER (EMERGENCY)
Age: 50
Discharge: HOME | End: 2025-05-06
Payer: COMMERCIAL

## 2025-05-05 VITALS
RESPIRATION RATE: 16 BRPM | OXYGEN SATURATION: 98 % | BODY MASS INDEX: 30.41 KG/M2 | HEART RATE: 88 BPM | TEMPERATURE: 98.1 F | SYSTOLIC BLOOD PRESSURE: 149 MMHG | DIASTOLIC BLOOD PRESSURE: 99 MMHG | WEIGHT: 200 LBS

## 2025-05-05 DIAGNOSIS — M54.50 ACUTE EXACERBATION OF CHRONIC LOW BACK PAIN: Primary | ICD-10-CM

## 2025-05-05 DIAGNOSIS — G89.29 ACUTE EXACERBATION OF CHRONIC LOW BACK PAIN: Primary | ICD-10-CM

## 2025-05-05 PROCEDURE — 99284 EMERGENCY DEPT VISIT MOD MDM: CPT

## 2025-05-05 ASSESSMENT — PAIN DESCRIPTION - DESCRIPTORS: DESCRIPTORS: ACHING

## 2025-05-05 ASSESSMENT — PAIN DESCRIPTION - LOCATION: LOCATION: BACK

## 2025-05-05 ASSESSMENT — PAIN - FUNCTIONAL ASSESSMENT: PAIN_FUNCTIONAL_ASSESSMENT: 0-10

## 2025-05-05 ASSESSMENT — PAIN SCALES - GENERAL: PAINLEVEL_OUTOF10: 7

## 2025-05-06 PROCEDURE — 6360000002 HC RX W HCPCS: Performed by: PHYSICIAN ASSISTANT

## 2025-05-06 PROCEDURE — 96372 THER/PROPH/DIAG INJ SC/IM: CPT

## 2025-05-06 RX ORDER — KETOROLAC TROMETHAMINE 30 MG/ML
30 INJECTION, SOLUTION INTRAMUSCULAR; INTRAVENOUS ONCE
Status: COMPLETED | OUTPATIENT
Start: 2025-05-06 | End: 2025-05-06

## 2025-05-06 RX ORDER — ORPHENADRINE CITRATE 30 MG/ML
60 INJECTION INTRAMUSCULAR; INTRAVENOUS ONCE
Status: COMPLETED | OUTPATIENT
Start: 2025-05-06 | End: 2025-05-06

## 2025-05-06 RX ORDER — ORPHENADRINE CITRATE 100 MG/1
100 TABLET ORAL 2 TIMES DAILY
Qty: 14 TABLET | Refills: 0 | Status: SHIPPED | OUTPATIENT
Start: 2025-05-06

## 2025-05-06 RX ORDER — KETOROLAC TROMETHAMINE 10 MG/1
10 TABLET, FILM COATED ORAL EVERY 6 HOURS PRN
Qty: 15 TABLET | Refills: 0 | Status: SHIPPED | OUTPATIENT
Start: 2025-05-06

## 2025-05-06 RX ADMIN — ORPHENADRINE CITRATE 60 MG: 60 INJECTION INTRAMUSCULAR; INTRAVENOUS at 00:04

## 2025-05-06 RX ADMIN — KETOROLAC TROMETHAMINE 30 MG: 30 INJECTION, SOLUTION INTRAMUSCULAR at 00:04

## 2025-05-06 NOTE — ED PROVIDER NOTES
[facility] EMERGENCY DEPT      Pt Name: Deangelo Burgess  MRN: [id number]  Birthdate 1975  Date of evaluation: 5/5/2025  Provider: Marianne Aragon PA-C    CHIEF COMPLAINT       Chief Complaint   Patient presents with    Lower back pain x3 days; workers comp    Back Pain       Nurses Notes reviewed and I agree except as noted in the HPI.      HISTORY OF PRESENT ILLNESS    Deangelo Burgess is a 50 y.o. male who presents to the [facility] with significant other for low back pain.  Patient reports being run over by a forklift in 2018 causing a fracture in his back.  Since then he has flares of back pain from time to time.  The past 3 days patient has been having an increase in his low back pain midline and on the right side.  Typically he uses a TENS unit, inversion table, and a massage chair at home.  The pain has worsened today prompting him to come in for evaluation.  The patient has taken no medications for pain.  There is no radiation of pain in the legs.  Pain is worse with movement and improves with sitting still.  This is a same as prior flares.  Patient denies incontinence of bowel or bladder, abdominal pain, fever, chills, nausea, vomiting, difficulty urinating or moving the bowels, numbness, weakness, or other complaints.    Location/Symptom: Midline and right sided low back pain  Timing/Onset: 3 days  Context/Setting: Nontraumatic flare of back pain which started from an industrial accident in 2018 when he was run over by a forklift causing a back fracture  Quality: Throbbing  Duration: Constant  Modifying Factors: Worse with certain movements and improves with sitting still  Severity: Moderate    PAST MEDICAL HISTORY    has a past medical history of Kidney stones.    SURGICAL HISTORY      has a past surgical history that includes Lithotripsy.    CURRENT MEDICATIONS       Discharge Medication List as of 5/6/2025 12:02 AM        CONTINUE these medications which have NOT CHANGED    Details

## 2025-05-06 NOTE — ED TRIAGE NOTES
Patient presents to ED with chief complaint of Lower back pain. Patient was in a fork lift accident in 2018, and has had problems with his back since. Patient resting in bed. Respirations easy and unlabored. No distress noted. Call light within reach.

## 2025-05-18 ENCOUNTER — HOSPITAL ENCOUNTER (EMERGENCY)
Age: 50
Discharge: HOME | End: 2025-05-18

## 2025-05-18 VITALS
SYSTOLIC BLOOD PRESSURE: 157 MMHG | BODY MASS INDEX: 30.31 KG/M2 | HEART RATE: 85 BPM | DIASTOLIC BLOOD PRESSURE: 106 MMHG | OXYGEN SATURATION: 95 % | HEIGHT: 68 IN | RESPIRATION RATE: 18 BRPM | TEMPERATURE: 98 F | WEIGHT: 200 LBS

## 2025-05-18 DIAGNOSIS — S31.119A LACERATION OF ABDOMINAL WALL, INITIAL ENCOUNTER: Primary | ICD-10-CM

## 2025-05-18 PROCEDURE — 99282 EMERGENCY DEPT VISIT SF MDM: CPT

## 2025-05-18 ASSESSMENT — PAIN DESCRIPTION - LOCATION: LOCATION: ABDOMEN

## 2025-05-18 ASSESSMENT — PAIN - FUNCTIONAL ASSESSMENT: PAIN_FUNCTIONAL_ASSESSMENT: 0-10

## 2025-05-18 ASSESSMENT — PAIN SCALES - GENERAL: PAINLEVEL_OUTOF10: 5

## 2025-05-18 NOTE — ED PROVIDER NOTES
capsule Take 1 capsule by mouth daily, Disp-30 capsule, R-0Normal      naproxen (NAPROSYN) 500 MG tablet Take 1 tablet by mouth 2 times daily (with meals) FOR 4 DAYSHistorical Med      aspirin 81 MG chewable tablet Take 1 tablet by mouth daily, Disp-30 tablet, R-0Normal             ALLERGIES     is allergic to dilaudid [hydromorphone].    FAMILY HISTORY     He indicated that his mother is . He indicated that his father is alive.       SOCIAL HISTORY       Social History     Tobacco Use    Smoking status: Never    Smokeless tobacco: Never   Substance Use Topics    Alcohol use: Yes     Comment: occassionally    Drug use: No       PHYSICAL EXAM       ED Triage Vitals [25 0015]   BP Systolic BP Percentile Diastolic BP Percentile Temp Temp Source Pulse Respirations SpO2   (!) 157/106 -- -- 98 °F (36.7 °C) Oral 85 18 95 %      Height Weight - Scale         1.727 m (5' 8\") 90.7 kg (200 lb)             Additional Vital Signs:  Vitals:    25 0015   BP: (!) 157/106   Pulse: 85   Resp: 18   Temp: 98 °F (36.7 °C)   SpO2: 95%     Physical Exam  Vitals and nursing note reviewed.   Constitutional:       Appearance: Normal appearance.   HENT:      Head: Normocephalic and atraumatic.      Right Ear: External ear normal.      Left Ear: External ear normal.   Eyes:      Conjunctiva/sclera: Conjunctivae normal.      Pupils: Pupils are equal, round, and reactive to light.   Cardiovascular:      Rate and Rhythm: Normal rate and regular rhythm.      Pulses: Normal pulses.      Heart sounds: Normal heart sounds.   Pulmonary:      Effort: Pulmonary effort is normal.      Breath sounds: Normal breath sounds.   Abdominal:       Musculoskeletal:         General: No swelling.   Neurological:      General: No focal deficit present.      Mental Status: He is alert.   Psychiatric:         Mood and Affect: Mood normal.         FORMAL DIAGNOSTIC RESULTS     RADIOLOGY: Interpretation per the Radiologist below, if available at the

## 2025-05-18 NOTE — DISCHARGE INSTRUCTIONS
Use ibuprofen or Tylenol (unless prescribed medications that have Tylenol in it) for pain.  You can take over the counter Ibuprofen (advil) tablets (4 tablets every 8 hours or 3 tablets every 6 hours or 2 tablets every 4 hours)    You can shower with the laceration, would avoid baths or swimming in lakes / rivers.  DO NOT apply bacitracin / triple antibiotic ointment / Neosporin / Vaseline to the wound.    The glue will fall off in 5 - 7 days.  After that you can apply sunscreen to the healing wound when outside to help with scarring.    Return to the emergency department for worsening of pain, fever > 101.5, redness around the wound or redness streaking up the body part, white drainage from wound.

## 2025-05-18 NOTE — ED TRIAGE NOTES
Pt presents to ED with abdomen laceration. Pt states he was running with a razor blade knife and he tripped. Pt reports bleeding at first. Bleeding now controlled. Pt states his last tetanus shot was about 4 years ago.